# Patient Record
Sex: FEMALE | Race: WHITE
[De-identification: names, ages, dates, MRNs, and addresses within clinical notes are randomized per-mention and may not be internally consistent; named-entity substitution may affect disease eponyms.]

---

## 2018-06-24 ENCOUNTER — HOSPITAL ENCOUNTER (EMERGENCY)
Dept: HOSPITAL 17 - PHED | Age: 53
Discharge: HOME | End: 2018-06-24
Payer: COMMERCIAL

## 2018-06-24 VITALS
DIASTOLIC BLOOD PRESSURE: 61 MMHG | SYSTOLIC BLOOD PRESSURE: 126 MMHG | HEART RATE: 69 BPM | RESPIRATION RATE: 16 BRPM | OXYGEN SATURATION: 96 %

## 2018-06-24 VITALS
OXYGEN SATURATION: 95 % | HEART RATE: 78 BPM | SYSTOLIC BLOOD PRESSURE: 170 MMHG | DIASTOLIC BLOOD PRESSURE: 71 MMHG | RESPIRATION RATE: 16 BRPM | TEMPERATURE: 97.7 F

## 2018-06-24 VITALS — OXYGEN SATURATION: 96 %

## 2018-06-24 VITALS — BODY MASS INDEX: 32.21 KG/M2 | WEIGHT: 184.09 LBS | HEIGHT: 63.5 IN

## 2018-06-24 DIAGNOSIS — J45.909: ICD-10-CM

## 2018-06-24 DIAGNOSIS — E11.65: Primary | ICD-10-CM

## 2018-06-24 DIAGNOSIS — Z79.84: ICD-10-CM

## 2018-06-24 DIAGNOSIS — R51: ICD-10-CM

## 2018-06-24 DIAGNOSIS — I10: ICD-10-CM

## 2018-06-24 DIAGNOSIS — Z91.11: ICD-10-CM

## 2018-06-24 DIAGNOSIS — Z79.82: ICD-10-CM

## 2018-06-24 LAB
ALBUMIN SERPL-MCNC: 4.5 GM/DL (ref 3.4–5)
ALP SERPL-CCNC: 70 U/L (ref 45–117)
ALT SERPL-CCNC: 77 U/L (ref 10–53)
AMORPHOUS SEDIMENT, URINE: (no result)
AST SERPL-CCNC: 54 U/L (ref 15–37)
BACTERIA #/AREA URNS HPF: (no result) /HPF
BASOPHILS # BLD AUTO: 0.1 TH/MM3 (ref 0–0.2)
BASOPHILS NFR BLD: 0.9 % (ref 0–2)
BILIRUB SERPL-MCNC: 0.4 MG/DL (ref 0.2–1)
BUN SERPL-MCNC: 19 MG/DL (ref 7–18)
CALCIUM SERPL-MCNC: 9.3 MG/DL (ref 8.5–10.1)
CHLORIDE SERPL-SCNC: 98 MEQ/L (ref 98–107)
COLOR UR: YELLOW
CREAT SERPL-MCNC: 1.1 MG/DL (ref 0.5–1)
EOSINOPHIL # BLD: 0.2 TH/MM3 (ref 0–0.4)
EOSINOPHIL NFR BLD: 2.1 % (ref 0–4)
ERYTHROCYTE [DISTWIDTH] IN BLOOD BY AUTOMATED COUNT: 11.9 % (ref 11.6–17.2)
GFR SERPLBLD BASED ON 1.73 SQ M-ARVRAT: 52 ML/MIN (ref 89–?)
GLUCOSE SERPL-MCNC: 344 MG/DL (ref 74–106)
GLUCOSE UR STRIP-MCNC: (no result) MG/DL
HCO3 BLD-SCNC: 25.8 MEQ/L (ref 21–32)
HCT VFR BLD CALC: 38.9 % (ref 35–46)
HGB BLD-MCNC: 13.5 GM/DL (ref 11.6–15.3)
HGB UR QL STRIP: (no result)
KETONES UR STRIP-MCNC: (no result) MG/DL
LEUKOCYTE ESTERASE UR QL STRIP: (no result) /HPF (ref 0–5)
LYMPHOCYTES # BLD AUTO: 1.7 TH/MM3 (ref 1–4.8)
LYMPHOCYTES NFR BLD AUTO: 19.2 % (ref 9–44)
MAGNESIUM SERPL-MCNC: 1.8 MG/DL (ref 1.5–2.5)
MCH RBC QN AUTO: 30.8 PG (ref 27–34)
MCHC RBC AUTO-ENTMCNC: 34.8 % (ref 32–36)
MCV RBC AUTO: 88.7 FL (ref 80–100)
MONOCYTE #: 0.4 TH/MM3 (ref 0–0.9)
MONOCYTES NFR BLD: 4.8 % (ref 0–8)
NEUTROPHILS # BLD AUTO: 6.3 TH/MM3 (ref 1.8–7.7)
NEUTROPHILS NFR BLD AUTO: 73 % (ref 16–70)
NITRITE UR QL STRIP: (no result)
PLATELET # BLD: 157 TH/MM3 (ref 150–450)
PMV BLD AUTO: 11.1 FL (ref 7–11)
PROT SERPL-MCNC: 7.5 GM/DL (ref 6.4–8.2)
RBC # BLD AUTO: 4.39 MIL/MM3 (ref 4–5.3)
SODIUM SERPL-SCNC: 134 MEQ/L (ref 136–145)
SP GR UR STRIP: 1.01 (ref 1–1.03)
SQUAMOUS #/AREA URNS HPF: > 8 /HPF (ref 0–5)
URINE LEUKOCYTE ESTERASE: (no result)
WBC # BLD AUTO: 8.8 TH/MM3 (ref 4–11)

## 2018-06-24 PROCEDURE — 70450 CT HEAD/BRAIN W/O DYE: CPT

## 2018-06-24 PROCEDURE — 80053 COMPREHEN METABOLIC PANEL: CPT

## 2018-06-24 PROCEDURE — 96374 THER/PROPH/DIAG INJ IV PUSH: CPT

## 2018-06-24 PROCEDURE — 83735 ASSAY OF MAGNESIUM: CPT

## 2018-06-24 PROCEDURE — 87086 URINE CULTURE/COLONY COUNT: CPT

## 2018-06-24 PROCEDURE — 81001 URINALYSIS AUTO W/SCOPE: CPT

## 2018-06-24 PROCEDURE — 96361 HYDRATE IV INFUSION ADD-ON: CPT

## 2018-06-24 PROCEDURE — 99284 EMERGENCY DEPT VISIT MOD MDM: CPT

## 2018-06-24 PROCEDURE — 85025 COMPLETE CBC W/AUTO DIFF WBC: CPT

## 2018-06-24 PROCEDURE — 82010 KETONE BODYS QUAN: CPT

## 2018-06-24 NOTE — RADRPT
EXAM DATE:  6/24/2018 5:05 PM EDT

AGE/SEX:        52 years / Female



INDICATIONS:  Dizziness. Elevated blood sugar.



CLINICAL DATA:  This is the patient's initial encounter. Patient reports that signs and symptoms have
 been present for 1 day and indicates a pain score of 0/10. 

                                                                          

MEDICAL/SURGICAL HISTORY:   Diabetes.  Hypertension. Thyroidectomy.



RADIATION DOSE:  53.68 CTDI (mGy)







COMPARISON:      No prior exams available for comparison. 







TECHNIQUE:  CT of the head without contrast.  Using automated exposure control and adjustment of the 
mA and/or kV according to patient size, radiation dose was kept as low as reasonably achievable to ob
tain optimal diagnostic quality images.  DICOM format image data is available electronically for revi
ew and comparison. 



FINDINGS: 

Cerebrum:  The ventricles are normal for age.  No evidence of midline shift, mass lesion, hemorrhage 
or acute infarction.  No extraaxial fluid collections are seen.

Posterior Fossa:  The cerebellum and brainstem are intact.  The 4th ventricle is midline.  The cerebe
llopontine angle is unremarkable.

Extracranial:  The visualized portion of the orbits is intact.

Skull:  The calvaria is intact.  No evidence of skull fracture.



CONCLUSION:

1.  No acute intracranial abnormalities.



Electronically signed by: Elton Farfan MD  6/24/2018 5:15 PM EDT

## 2018-06-24 NOTE — PD
HPI


Chief Complaint:  Diabetic


Time Seen by Provider:  16:04


Travel History


International Travel<30 days:  No


Contact w/Intl Traveler<30days:  No


Traveled to known affect area:  No





History of Present Illness


HPI


Patient presents to the emergency department complaining of elevated blood 

sugar.  States that she is noncompliant with her diet and her Accu-Chek was 

397.  Took an extra glipizide around 1 this afternoon ate a quarter of a 

sandwich and repeat Accu-Chek was 396.  Is complaining of a headache.  She 

states it is normally not difficult to control her blood sugars.  He denies 

fever, chills, nausea, vomiting, chest pain, abdominal pain, dysuria.  She 

reports nonbloody diarrhea 2 days, 2 episodes daily.  Also reporting right 

side tingling and chronic dyspnea secondary to asthma. No dysuria.





PFSH


Past Medical History


Hx Anticoagulant Therapy:  Yes (asa 81mg)


Cardiovascular Problems:  Yes (htn on meds)


Diabetes:  Yes (type 2)


Diminished Hearing:  No


Genitourinary:  Yes (LEAKY BLADDER)


Hypertension:  Yes


Respiratory:  Yes (asthma)


Immunizations Current:  No


Thyroid Disease:  Yes (PARTIAL THYROID)


Pregnant?:  Not Pregnant


Menopausal:  Yes


Tubal Ligation:  Yes





Past Surgical History


Endocrine Surgery:  Yes (PARTIAL THYROID)


Tonsillectomy:  Yes


Other Surgery:  Yes (PLASTIC SURG SCALP)





Social History


Alcohol Use:  Yes (OCC)


Tobacco Use:  No (QUIT 2008)


Substance Use:  No





Allergies-Medications


(Allergen,Severity, Reaction):  


Coded Allergies:  


     codeine (Unverified  Allergy, Severe, ITCH AND HEADACHES, 6/24/18)


Reported Meds & Prescriptions





Reported Meds & Active Scripts


Active


Reported


Ventolin Hfa 18 GM Inh (Albuterol Sulfate) 90 Mcg/Act Aer 2 Puff INH Q4-6H PRN


Advair Hfa 12 GM Inh (Fluticasone-Salmeterol 12 GM Inh) 45-21 Mcg/Act Aer 2 

Puff INH BID


Januvia (Sitagliptin Phosphate) 100 Mg Tab 100 Mg PO DAILY


Amlodipine (Amlodipine Besylate) 5 Mg Tab Unknown Dose PO DAILY


Levothyroxine (Levothyroxine Sodium) 50 Mcg Tab Unknown Dose PO DAILY


Losartan (Losartan Potassium) 50 Mg Tab Unknown Dose PO DAILY


Aspirin Low Dose (Aspirin) 81 Mg Chew 81 Mg CHEW DAILY


Hydrochlorothiazide 12.5 Mg Cap Unknown Dose PO DAILY


Atorvastatin (Atorvastatin Calcium) 10 Mg Tab Unknown Dose PO HS


Oxybutynin ER 24 HR (Oxybutynin Chloride) 5 Mg Tab 5 Mg PO DAILY


Citalopram (Citalopram Hydrobromide) 20 Mg Tab 20 Mg PO DAILY


Glipizide 10 Mg Tab 10 Mg PO BIDAC


     Take 30 minutes before a meal








Review of Systems


Except as stated in HPI:  all other systems reviewed are Neg





Physical Exam


Narrative


GENERAL: No acute distress.


SKIN: Focused skin assessment warm/dry.


HEAD: Atraumatic. Normocephalic. 


EYES: Pupils equal and round. No scleral icterus. No injection or drainage. 


ENT: No nasal bleeding or discharge.  Mucous membranes pink and moist.


NECK: Trachea midline. No JVD. 


CARDIOVASCULAR: Regular rate and rhythm.  No murmur appreciated.


RESPIRATORY: No accessory muscle use. Clear to auscultation. Breath sounds 

equal bilaterally. 


GASTROINTESTINAL: Abdomen soft, non-tender, nondistended. Hepatic and splenic 

margins not palpable. 


MUSCULOSKELETAL: No obvious deformities. No clubbing.  No cyanosis.  No edema. 


NEUROLOGICAL: Awake and alert. No obvious cranial nerve deficits.  Motor 

grossly within normal limits. Normal speech.


PSYCHIATRIC: Appropriate mood and affect; insight and judgment normal.





Data


Data


Last Documented VS





Vital Signs








  Date Time  Temp Pulse Resp B/P (MAP) Pulse Ox O2 Delivery O2 Flow Rate FiO2


 


6/24/18 18:20  69 16 126/61 (82) 96 Room Air  


 


6/24/18 15:58 97.7       








Orders





 Orders


Complete Blood Count With Diff (6/24/18 16:13)


Comprehensive Metabolic Panel (6/24/18 16:13)


Magnesium (Mg) (6/24/18 16:13)


Beta Hydroxybutyrate (Acetone) (6/24/18 16:13)


Urinalysis - C+S If Indicated (6/24/18 16:13)


Ecg Monitoring (6/24/18 16:13)


Iv Access Insert/Monitor (6/24/18 16:13)


Oximetry (6/24/18 16:13)


Sodium Chlor 0.9% 1000 Ml Inj (Ns 1000 M (6/24/18 16:15)


Ct Brain W/O Iv Contrast(Rout) (6/24/18 16:13)


Urine Culture (6/24/18 16:30)


Insulin Human Regular Inj (Novolin R Inj (6/24/18 18:30)


Insulin Human Regular Inj (Novolin R Inj (6/24/18 18:45)





Labs





Laboratory Tests








Test


  6/24/18


16:30 6/24/18


16:45


 


Urine Collection Type CLEAN CATCH  


 


Urine Color YELLOW  


 


Urine Turbidity CLEAR  


 


Urine pH 6.0  


 


Urine Specific Gravity 1.010  


 


Urine Protein NEG mg/dL  


 


Urine Glucose (UA)


  1000 OR


GREATER mg/dL 


 


 


Urine Ketones NEG mg/dL  


 


Urine Occult Blood NEG  


 


Urine Nitrite NEG  


 


Urine Bilirubin NEG  


 


Urine Urobilinogen 0.2 MG/DL  


 


Urine Leukocyte Esterase NEG  


 


Urine WBC 3-5 /hpf  


 


Urine Squamous Epithelial


Cells > 8 /hpf 


  


 


 


Urine Amorphous Sediment FEW  


 


Urine Bacteria MOD /hpf  


 


Microscopic Urinalysis Comment


  CULTURE


INDICATED 


 


 


Urine Collection Time 1630  


 


White Blood Count  8.8 TH/MM3 


 


Red Blood Count  4.39 MIL/MM3 


 


Hemoglobin  13.5 GM/DL 


 


Hematocrit  38.9 % 


 


Mean Corpuscular Volume  88.7 FL 


 


Mean Corpuscular Hemoglobin  30.8 PG 


 


Mean Corpuscular Hemoglobin


Concent 


  34.8 % 


 


 


Red Cell Distribution Width  11.9 % 


 


Platelet Count  157 TH/MM3 


 


Mean Platelet Volume  11.1 FL 


 


Neutrophils (%) (Auto)  73.0 % 


 


Lymphocytes (%) (Auto)  19.2 % 


 


Monocytes (%) (Auto)  4.8 % 


 


Eosinophils (%) (Auto)  2.1 % 


 


Basophils (%) (Auto)  0.9 % 


 


Neutrophils # (Auto)  6.3 TH/MM3 


 


Lymphocytes # (Auto)  1.7 TH/MM3 


 


Monocytes # (Auto)  0.4 TH/MM3 


 


Eosinophils # (Auto)  0.2 TH/MM3 


 


Basophils # (Auto)  0.1 TH/MM3 


 


CBC Comment  DIFF FINAL 


 


Differential Comment   


 


Blood Urea Nitrogen  19 MG/DL 


 


Creatinine  1.10 MG/DL 


 


Random Glucose  344 MG/DL 


 


Total Protein  7.5 GM/DL 


 


Albumin  4.5 GM/DL 


 


Calcium Level  9.3 MG/DL 


 


Magnesium Level  1.8 MG/DL 


 


Alkaline Phosphatase  70 U/L 


 


Aspartate Amino Transf


(AST/SGOT) 


  54 U/L 


 


 


Alanine Aminotransferase


(ALT/SGPT) 


  77 U/L 


 


 


Total Bilirubin  0.4 MG/DL 


 


Sodium Level  134 MEQ/L 


 


Potassium Level  3.7 MEQ/L 


 


Chloride Level  98 MEQ/L 


 


Carbon Dioxide Level  25.8 MEQ/L 


 


Anion Gap  10 MEQ/L 


 


Estimat Glomerular Filtration


Rate 


  52 ML/MIN 


 


 


B-Hydroxybutyrate  0.20 MMOL/L 











MDM


Medical Decision Making


Medical Screen Exam Complete:  Yes


Emergency Medical Condition:  Yes


Interpretation(s)


Labs: UA positive for glucose and moderate bacteria, culture pending; CBC 

within normal limits; chem-BUN/cr/glc/AST/ALT increased; B hydroxy-wnl





Last Impressions








Head CT 6/24/18 1613 Signed





Impressions: 





 CONCLUSION:





 1.  No acute intracranial abnormalities.





  





 








Differential Diagnosis


Hyperglycemia, DKA, electrolyte abnormality, CVA


Narrative Course


Patient presents to the emergency department complaint of hyperglycemia.  

Patient placed on cardiac monitor, IV access obtained, 1 L normal saline ordered

, and head CT/labs ordered.





Accucheck after 1L IV NS->270. Patient given 5 units IV regular insulin. States 

"I'm going to go home and eat."





Diagnosis





 Primary Impression:  


 Hyperglycemia


Patient Instructions:  Diabetic Hyperglycemia (ED), General Instructions





***Additional Instructions:  


1. Meds as directed. Since the night dose of diabetes med already taken, do not 

take another dose. 2. Followup with your primary care doctor in 24-48 hours for 

possible change in diabetes medication. Will need repeat chemistry to check 

liver function tests.


Disposition:  01 DISCHARGE HOME


Condition:  Stable











Kim Khan MD Jun 24, 2018 16:20

## 2019-09-04 ENCOUNTER — HOSPITAL ENCOUNTER (EMERGENCY)
Age: 54
Discharge: HOME | End: 2019-09-04
Payer: COMMERCIAL

## 2019-09-04 VITALS
WEIGHT: 176 LBS | DIASTOLIC BLOOD PRESSURE: 78 MMHG | SYSTOLIC BLOOD PRESSURE: 170 MMHG | TEMPERATURE: 98.1 F | HEART RATE: 69 BPM | HEIGHT: 63 IN | BODY MASS INDEX: 31.18 KG/M2 | RESPIRATION RATE: 16 BRPM | OXYGEN SATURATION: 96 %

## 2019-09-04 DIAGNOSIS — Z76.0 ENCOUNTER FOR MEDICATION REFILL: Primary | ICD-10-CM

## 2019-09-04 PROCEDURE — 99281 EMR DPT VST MAYX REQ PHY/QHP: CPT

## 2019-09-04 RX ORDER — LOSARTAN POTASSIUM 50 MG/1
50 TABLET ORAL DAILY
Qty: 30 TABLET | Refills: 0 | Status: SHIPPED | OUTPATIENT
Start: 2019-09-04 | End: 2019-09-20 | Stop reason: SDUPTHER

## 2019-09-04 RX ORDER — BUPROPION HYDROCHLORIDE 100 MG/1
100 TABLET ORAL 2 TIMES DAILY
Qty: 60 TABLET | Refills: 0 | Status: SHIPPED | OUTPATIENT
Start: 2019-09-04 | End: 2019-09-20 | Stop reason: ALTCHOICE

## 2019-09-04 RX ORDER — LEVOTHYROXINE SODIUM 0.07 MG/1
75 TABLET ORAL DAILY
Qty: 30 TABLET | Refills: 0 | Status: SHIPPED | OUTPATIENT
Start: 2019-09-04 | End: 2019-09-20 | Stop reason: SDUPTHER

## 2019-09-04 RX ORDER — AMLODIPINE BESYLATE 5 MG/1
5 TABLET ORAL DAILY
Qty: 30 TABLET | Refills: 0 | Status: SHIPPED | OUTPATIENT
Start: 2019-09-04 | End: 2019-09-20 | Stop reason: SDUPTHER

## 2019-09-04 RX ORDER — ATORVASTATIN CALCIUM 10 MG/1
10 TABLET, FILM COATED ORAL DAILY
Qty: 30 TABLET | Refills: 0 | Status: SHIPPED | OUTPATIENT
Start: 2019-09-04 | End: 2019-09-20 | Stop reason: SDUPTHER

## 2019-09-04 RX ORDER — OXYBUTYNIN CHLORIDE 5 MG/1
5 TABLET, EXTENDED RELEASE ORAL EVERY EVENING
Qty: 30 TABLET | Refills: 0 | Status: SHIPPED | OUTPATIENT
Start: 2019-09-04 | End: 2019-09-20 | Stop reason: SDUPTHER

## 2019-09-04 RX ORDER — FENOFIBRATE 160 MG/1
160 TABLET ORAL DAILY
Qty: 30 TABLET | Refills: 0 | Status: SHIPPED | OUTPATIENT
Start: 2019-09-04 | End: 2019-09-20 | Stop reason: SDUPTHER

## 2019-09-04 RX ORDER — HYDROCHLOROTHIAZIDE 12.5 MG/1
12.5 CAPSULE, GELATIN COATED ORAL DAILY
Qty: 30 CAPSULE | Refills: 0 | Status: SHIPPED | OUTPATIENT
Start: 2019-09-04 | End: 2019-09-20 | Stop reason: SDUPTHER

## 2019-09-04 RX ORDER — GLIPIZIDE 10 MG/1
10 TABLET ORAL DAILY
Qty: 30 TABLET | Refills: 0 | Status: SHIPPED | OUTPATIENT
Start: 2019-09-04 | End: 2019-09-20 | Stop reason: SDUPTHER

## 2019-09-04 NOTE — ED PROVIDER NOTES
laboratory and radiology results have been personally reviewed by myself)  Labs:  No results found for this visit on 09/04/19. Imaging: All Radiology results interpreted by Radiologist unless otherwise noted. No orders to display       ED Course / Medical Decision Making   Medications - No data to display     Consults:   None    Counseling/MDM:   The emergency provider has spoken with the patient and spouse/SO and discussed todays emergency visit, in addition to providing specific details for the plan of care and counseling regarding the diagnosis and prognosis. She was counseled on the role of the emergency department regarding prescribing medications for chronic conditions including Narcotic and other controlled substances. Based on the presenting complaint and nature of illness, the requested medications will be provided today in prescription form and she is instructed to contact their provider for supplemental medications as soon as possible. Questions are answered at this time and they are agreeable with the plan. Assessment      1.  Encounter for medication refill      Plan   Discharge to home  Patient condition is stable    New Medications     Discharge Medication List as of 9/4/2019 11:58 AM      START taking these medications    Details   glipiZIDE (GLUCOTROL) 10 MG tablet Take 1 tablet by mouth daily, Disp-30 tablet, R-0Print      atorvastatin (LIPITOR) 10 MG tablet Take 1 tablet by mouth daily, Disp-30 tablet, R-0Print      amLODIPine (NORVASC) 5 MG tablet Take 1 tablet by mouth daily, Disp-30 tablet, R-0Print      fenofibrate 160 MG tablet Take 1 tablet by mouth daily, Disp-30 tablet, R-0Print      losartan (COZAAR) 50 MG tablet Take 1 tablet by mouth daily, Disp-30 tablet, R-0Print      oxybutynin (DITROPAN XL) 5 MG extended release tablet Take 1 tablet by mouth every evening, Disp-30 tablet, R-0Print      hydrochlorothiazide (MICROZIDE) 12.5 MG capsule Take 1 capsule by mouth daily, Disp-30 capsule, R-0Print      levothyroxine (SYNTHROID) 75 MCG tablet Take 1 tablet by mouth Daily, Disp-30 tablet, R-0Print      Ertugliflozin L-PyroglutamicAc (STEGLATRO) 15 MG TABS Take 1 tablet by mouth daily, Disp-30 tablet, R-0Print      buPROPion (WELLBUTRIN) 100 MG tablet Take 1 tablet by mouth 2 times daily, Disp-60 tablet, R-0Print           Electronically signed by CHANA Bradley CNP   DD: 9/4/19  **This report was transcribed using voice recognition software. Every effort was made to ensure accuracy; however, inadvertent computerized transcription errors may be present.   END OF ED PROVIDER NOTE      CHANA Bradley CNP  09/05/19 1656

## 2019-09-04 NOTE — ED NOTES
Discharge instructions given. Patient verbalizes understanding. No other noted or stated problems at this time. Patient will follow up with primary care.      Radha Velasquez RN  09/04/19 [id number]

## 2019-09-20 ENCOUNTER — OFFICE VISIT (OUTPATIENT)
Dept: PRIMARY CARE CLINIC | Age: 54
End: 2019-09-20
Payer: COMMERCIAL

## 2019-09-20 VITALS
RESPIRATION RATE: 18 BRPM | HEART RATE: 76 BPM | TEMPERATURE: 97.9 F | BODY MASS INDEX: 30.62 KG/M2 | WEIGHT: 172.8 LBS | OXYGEN SATURATION: 98 % | HEIGHT: 63 IN | SYSTOLIC BLOOD PRESSURE: 118 MMHG | DIASTOLIC BLOOD PRESSURE: 72 MMHG

## 2019-09-20 DIAGNOSIS — E03.9 ACQUIRED HYPOTHYROIDISM: ICD-10-CM

## 2019-09-20 DIAGNOSIS — E78.2 MIXED HYPERLIPIDEMIA: ICD-10-CM

## 2019-09-20 DIAGNOSIS — F33.0 MILD EPISODE OF RECURRENT MAJOR DEPRESSIVE DISORDER (HCC): ICD-10-CM

## 2019-09-20 DIAGNOSIS — N39.3 STRESS INCONTINENCE: ICD-10-CM

## 2019-09-20 DIAGNOSIS — M62.838 MUSCLE SPASM: ICD-10-CM

## 2019-09-20 DIAGNOSIS — E11.8 TYPE 2 DIABETES MELLITUS WITH COMPLICATION, WITHOUT LONG-TERM CURRENT USE OF INSULIN (HCC): ICD-10-CM

## 2019-09-20 DIAGNOSIS — G89.29 CHRONIC RIGHT-SIDED LOW BACK PAIN WITH RIGHT-SIDED SCIATICA: ICD-10-CM

## 2019-09-20 DIAGNOSIS — I10 ESSENTIAL HYPERTENSION: Primary | ICD-10-CM

## 2019-09-20 DIAGNOSIS — M54.41 CHRONIC RIGHT-SIDED LOW BACK PAIN WITH RIGHT-SIDED SCIATICA: ICD-10-CM

## 2019-09-20 PROCEDURE — 1036F TOBACCO NON-USER: CPT | Performed by: FAMILY MEDICINE

## 2019-09-20 PROCEDURE — 99204 OFFICE O/P NEW MOD 45 MIN: CPT | Performed by: FAMILY MEDICINE

## 2019-09-20 PROCEDURE — 2022F DILAT RTA XM EVC RTNOPTHY: CPT | Performed by: FAMILY MEDICINE

## 2019-09-20 PROCEDURE — G8417 CALC BMI ABV UP PARAM F/U: HCPCS | Performed by: FAMILY MEDICINE

## 2019-09-20 PROCEDURE — 3046F HEMOGLOBIN A1C LEVEL >9.0%: CPT | Performed by: FAMILY MEDICINE

## 2019-09-20 PROCEDURE — G8427 DOCREV CUR MEDS BY ELIG CLIN: HCPCS | Performed by: FAMILY MEDICINE

## 2019-09-20 PROCEDURE — 3017F COLORECTAL CA SCREEN DOC REV: CPT | Performed by: FAMILY MEDICINE

## 2019-09-20 RX ORDER — FENOFIBRATE 160 MG/1
160 TABLET ORAL DAILY
Qty: 30 TABLET | Refills: 5 | Status: SHIPPED
Start: 2019-09-20 | End: 2020-03-18 | Stop reason: SDUPTHER

## 2019-09-20 RX ORDER — CYCLOBENZAPRINE HCL 10 MG
10 TABLET ORAL 2 TIMES DAILY PRN
COMMUNITY
End: 2019-09-20 | Stop reason: SDUPTHER

## 2019-09-20 RX ORDER — ATORVASTATIN CALCIUM 10 MG/1
10 TABLET, FILM COATED ORAL DAILY
Qty: 30 TABLET | Refills: 5 | Status: SHIPPED
Start: 2019-09-20 | End: 2020-03-18 | Stop reason: SDUPTHER

## 2019-09-20 RX ORDER — GLIPIZIDE 10 MG/1
10 TABLET ORAL DAILY
Qty: 30 TABLET | Refills: 5 | Status: SHIPPED
Start: 2019-09-20 | End: 2020-03-18

## 2019-09-20 RX ORDER — AMLODIPINE BESYLATE 5 MG/1
5 TABLET ORAL DAILY
Qty: 30 TABLET | Refills: 5 | Status: SHIPPED
Start: 2019-09-20 | End: 2020-03-18 | Stop reason: SDUPTHER

## 2019-09-20 RX ORDER — ASPIRIN 81 MG/1
81 TABLET ORAL DAILY
COMMUNITY
End: 2021-08-18 | Stop reason: SDUPTHER

## 2019-09-20 RX ORDER — OXYBUTYNIN CHLORIDE 5 MG/1
5 TABLET, EXTENDED RELEASE ORAL EVERY EVENING
Qty: 30 TABLET | Refills: 5 | Status: SHIPPED | OUTPATIENT
Start: 2019-09-20 | End: 2019-10-16 | Stop reason: SDUPTHER

## 2019-09-20 RX ORDER — DIPHENHYDRAMINE HCL 25 MG
25 CAPSULE ORAL NIGHTLY
COMMUNITY
End: 2021-08-18 | Stop reason: ALTCHOICE

## 2019-09-20 RX ORDER — BIOTIN 1 MG
TABLET ORAL DAILY
COMMUNITY
End: 2020-03-18

## 2019-09-20 RX ORDER — LEVOTHYROXINE SODIUM 0.07 MG/1
75 TABLET ORAL DAILY
Qty: 30 TABLET | Refills: 5 | Status: SHIPPED | OUTPATIENT
Start: 2019-09-20 | End: 2019-09-25 | Stop reason: DRUGHIGH

## 2019-09-20 RX ORDER — ALBUTEROL SULFATE 90 UG/1
2 AEROSOL, METERED RESPIRATORY (INHALATION) EVERY 4 HOURS PRN
COMMUNITY
End: 2020-03-27 | Stop reason: SDUPTHER

## 2019-09-20 RX ORDER — SERTRALINE HYDROCHLORIDE 25 MG/1
25 TABLET, FILM COATED ORAL DAILY
Qty: 30 TABLET | Refills: 0 | Status: SHIPPED | OUTPATIENT
Start: 2019-09-20 | End: 2019-10-16 | Stop reason: SDUPTHER

## 2019-09-20 RX ORDER — HYDROCHLOROTHIAZIDE 12.5 MG/1
12.5 CAPSULE, GELATIN COATED ORAL DAILY
Qty: 30 CAPSULE | Refills: 5 | Status: SHIPPED | OUTPATIENT
Start: 2019-09-20 | End: 2019-12-18 | Stop reason: ALTCHOICE

## 2019-09-20 RX ORDER — CYCLOBENZAPRINE HCL 10 MG
10 TABLET ORAL DAILY PRN
Qty: 30 TABLET | Refills: 5 | Status: SHIPPED | OUTPATIENT
Start: 2019-09-20 | End: 2019-10-20

## 2019-09-20 RX ORDER — BUPROPION HYDROCHLORIDE 100 MG/1
100 TABLET ORAL 2 TIMES DAILY
Qty: 60 TABLET | Refills: 0 | Status: CANCELLED | OUTPATIENT
Start: 2019-09-20

## 2019-09-20 RX ORDER — LOSARTAN POTASSIUM 50 MG/1
50 TABLET ORAL DAILY
Qty: 30 TABLET | Refills: 5 | Status: SHIPPED
Start: 2019-09-20 | End: 2020-03-18 | Stop reason: SDUPTHER

## 2019-09-20 RX ORDER — MELOXICAM 7.5 MG/1
7.5 TABLET ORAL DAILY PRN
Qty: 90 TABLET | Refills: 1 | Status: SHIPPED | OUTPATIENT
Start: 2019-09-20 | End: 2019-10-16

## 2019-09-20 ASSESSMENT — ENCOUNTER SYMPTOMS
CONSTIPATION: 0
ABDOMINAL PAIN: 0
NAUSEA: 0
SHORTNESS OF BREATH: 0
COUGH: 0
DIARRHEA: 0
VOMITING: 0
BACK PAIN: 1
WHEEZING: 0

## 2019-09-20 ASSESSMENT — PATIENT HEALTH QUESTIONNAIRE - PHQ9
9. THOUGHTS THAT YOU WOULD BE BETTER OFF DEAD, OR OF HURTING YOURSELF: 0
2. FEELING DOWN, DEPRESSED OR HOPELESS: 2
SUM OF ALL RESPONSES TO PHQ QUESTIONS 1-9: 8
6. FEELING BAD ABOUT YOURSELF - OR THAT YOU ARE A FAILURE OR HAVE LET YOURSELF OR YOUR FAMILY DOWN: 3
1. LITTLE INTEREST OR PLEASURE IN DOING THINGS: 0
3. TROUBLE FALLING OR STAYING ASLEEP: 0
SUM OF ALL RESPONSES TO PHQ9 QUESTIONS 1 & 2: 2
8. MOVING OR SPEAKING SO SLOWLY THAT OTHER PEOPLE COULD HAVE NOTICED. OR THE OPPOSITE, BEING SO FIGETY OR RESTLESS THAT YOU HAVE BEEN MOVING AROUND A LOT MORE THAN USUAL: 0
SUM OF ALL RESPONSES TO PHQ QUESTIONS 1-9: 2
SUM OF ALL RESPONSES TO PHQ9 QUESTIONS 1 & 2: 2
2. FEELING DOWN, DEPRESSED OR HOPELESS: 2
SUM OF ALL RESPONSES TO PHQ QUESTIONS 1-9: 8
SUM OF ALL RESPONSES TO PHQ QUESTIONS 1-9: 2
7. TROUBLE CONCENTRATING ON THINGS, SUCH AS READING THE NEWSPAPER OR WATCHING TELEVISION: 0
5. POOR APPETITE OR OVEREATING: 3
4. FEELING TIRED OR HAVING LITTLE ENERGY: 0
1. LITTLE INTEREST OR PLEASURE IN DOING THINGS: 0

## 2019-09-20 NOTE — PROGRESS NOTES
19  Harsha Polanco : 1965 Sex: female  Age: 48 y.o. Chief Complaint   Patient presents with    Establish Care     check-up    Health Maintenance     due for mammogram,pap,tdap,flu,hep c,shingrix       HPI:  48 y.o. female presents today to establish care with a new provider. Her and her  recently moved to the area from [place]. Patient's chart, medical, surgical and medication history all reviewed with the patient. No information in the system. Patient seen at [facility] for medication refills on 19. Diabetes Mellitus  48 y.o. female presents for follow up of type 2 diabetes. Current diabetic medications include: oral agent (monotherapy): glipizide (Glucotrol) and insulin injections: Lantus : 30U QAM.  Previous medications tried include: oral agents (dual therapy): metformin (generic), Steglatro, but failed due to nausea, vomiting and abdominal pain (Metformin) and cost/coverage (Steglatro). Patient checks blood sugars 0 times per day. Most recent HgA1c was >11% per the patient- no recent labs to review. Patient spoke with Alcon Cummings and was told that Lantus will have to be changed to Lauren  or [place]. Steglatro will not be covered. Hypertension   The patient presents today for follow up of HTN. The problem is well controlled. Risk factors for coronary artery disease include Age > 27, HTN, diabetes and elevated cholesterol. Current treatments include amlodipine (Norvase), hydrochlorothiazide (HCTZ) and losartran (Cozaar). The patient is compliant most of the time. Lifestyle changes the patient has made include none. Today the patient is complaining of none. Hypothyroidism  Patient presents for routine follow up of Hypothyroidism. Current symptoms: none. Patient denies change in energy level, diarrhea, heat / cold intolerance, palpitations and weight changes. Symptoms have been well-controlled. No difficulty swallowing or masses felt.   Last TSH was in [place] Diagnosis Date    Depression     Hyperlipidemia     Hypertension     Hypothyroidism     Type 2 diabetes mellitus without complication (HCC)     Urinary incontinence      Past Surgical History:   Procedure Laterality Date    THYROIDECTOMY, PARTIAL      TONSILLECTOMY      TUBAL LIGATION       Family History   Problem Relation Age of Onset    Pancreatic Cancer Mother         Metastatic to liver    Other Father         Doesn't know biological father    Depression Sister     Lung Cancer Maternal Grandmother     Heart Attack Maternal Grandfather 37     Social History     Socioeconomic History    Marital status:      Spouse name: Not on file    Number of children: Not on file    Years of education: Not on file    Highest education level: Not on file   Occupational History    Not on file   Social Needs    Financial resource strain: Not on file    Food insecurity:     Worry: Not on file     Inability: Not on file    Transportation needs:     Medical: Not on file     Non-medical: Not on file   Tobacco Use    Smoking status: Former Smoker     Packs/day: 2.00     Years: 20.00     Pack years: 40.00     Types: Cigarettes     Last attempt to quit:      Years since quittin.7    Smokeless tobacco: Never Used   Substance and Sexual Activity    Alcohol use: Not on file    Drug use: Not on file    Sexual activity: Not on file   Lifestyle    Physical activity:     Days per week: Not on file     Minutes per session: Not on file    Stress: Not on file   Relationships    Social connections:     Talks on phone: Not on file     Gets together: Not on file     Attends [employer] service: Not on file     Active member of club or organization: Not on file     Attends meetings of clubs or organizations: Not on file     Relationship status: Not on file    Intimate partner violence:     Fear of current or ex partner: Not on file     Emotionally abused: Not on file     Physically abused: Not on file all orders for this visit:    Essential hypertension  -     losartan (COZAAR) 50 MG tablet; Take 1 tablet by mouth daily  -     hydrochlorothiazide (MICROZIDE) 12.5 MG capsule; Take 1 capsule by mouth daily  -     amLODIPine (NORVASC) 5 MG tablet; Take 1 tablet by mouth daily  -     CBC Auto Differential; Future  -     Comprehensive Metabolic Panel; Future  Improved since ER visit now that she is back on medication. Will likely combine Losartan and HCTZ for ease in the future. Type 2 diabetes mellitus with complication, without long-term current use of insulin ([facility])  -     glipiZIDE (GLUCOTROL) 10 MG tablet; Take 1 tablet by mouth daily  -     Insulin Degludec (TRESIBA FLEXTOUCH) 200 UNIT/ML SOPN; Inject 30 Units into the skin nightly  -     Hemoglobin A1C; Future  -     Microalbumin / Creatinine Urine Ratio; Future  Need labs to guide further therapy. Will change Lantus to [place] given insurance change. Will see her back in 1 month to discuss labs and DM2 treatment course    Mixed hyperlipidemia  -     fenofibrate 160 MG tablet; Take 1 tablet by mouth daily  -     atorvastatin (LIPITOR) 10 MG tablet; Take 1 tablet by mouth daily  -     Lipid Panel; Future  Discussed increasing dose of Atorvastatin and DC Fenofibrate. Will check labs first.    Acquired hypothyroidism  -     levothyroxine (SYNTHROID) 75 MCG tablet; Take 1 tablet by mouth Daily  -     TSH without Reflex; Future  Need labs    Stress incontinence  -     oxybutynin (DITROPAN XL) 5 MG extended release tablet; Take 1 tablet by mouth every evening    Muscle spasm  -     cyclobenzaprine (FLEXERIL) 10 MG tablet; Take 1 tablet by mouth daily as needed for Muscle spasms  Patient takes PRN    Mild episode of recurrent major depressive disorder (HCC)  -     sertraline (ZOLOFT) 25 MG tablet; Take 1 tablet by mouth daily  -     Vitamin D 25 Hydroxy; Future  Will change Wellbutrin to Zoloft as it is no longer benfitting her.   Will wean off Wellbutrin while

## 2019-09-24 ENCOUNTER — HOSPITAL ENCOUNTER (OUTPATIENT)
Age: 54
Discharge: HOME | End: 2019-09-26
Payer: COMMERCIAL

## 2019-09-24 DIAGNOSIS — E78.2 MIXED HYPERLIPIDEMIA: ICD-10-CM

## 2019-09-24 DIAGNOSIS — E03.9 ACQUIRED HYPOTHYROIDISM: ICD-10-CM

## 2019-09-24 DIAGNOSIS — I10 ESSENTIAL HYPERTENSION: ICD-10-CM

## 2019-09-24 DIAGNOSIS — F33.0 MILD EPISODE OF RECURRENT MAJOR DEPRESSIVE DISORDER (HCC): ICD-10-CM

## 2019-09-24 DIAGNOSIS — E11.8 TYPE 2 DIABETES MELLITUS WITH COMPLICATION, WITHOUT LONG-TERM CURRENT USE OF INSULIN (HCC): ICD-10-CM

## 2019-09-24 LAB
ALBUMIN SERPL-MCNC: 4.8 G/DL (ref 3.5–5.2)
ALP BLD-CCNC: 88 U/L (ref 35–104)
ALT SERPL-CCNC: 40 U/L (ref 0–32)
ANION GAP SERPL CALCULATED.3IONS-SCNC: 19 MMOL/L (ref 7–16)
AST SERPL-CCNC: 40 U/L (ref 0–31)
BASOPHILS ABSOLUTE: 0.06 E9/L (ref 0–0.2)
BASOPHILS RELATIVE PERCENT: 0.8 % (ref 0–2)
BILIRUB SERPL-MCNC: 0.5 MG/DL (ref 0–1.2)
BUN BLDV-MCNC: 22 MG/DL (ref 6–20)
CALCIUM SERPL-MCNC: 9.3 MG/DL (ref 8.6–10.2)
CHLORIDE BLD-SCNC: 96 MMOL/L (ref 98–107)
CHOLESTEROL, TOTAL: 179 MG/DL (ref 0–199)
CO2: 20 MMOL/L (ref 22–29)
CREAT SERPL-MCNC: 0.8 MG/DL (ref 0.5–1)
CREATININE URINE: 113 MG/DL (ref 29–226)
EOSINOPHILS ABSOLUTE: 0.26 E9/L (ref 0.05–0.5)
EOSINOPHILS RELATIVE PERCENT: 3.3 % (ref 0–6)
GFR AFRICAN AMERICAN: >60
GFR NON-AFRICAN AMERICAN: >60 ML/MIN/1.73
GLUCOSE BLD-MCNC: 337 MG/DL (ref 74–99)
HBA1C MFR BLD: 10.5 % (ref 4–5.6)
HCT VFR BLD CALC: 42.7 % (ref 34–48)
HDLC SERPL-MCNC: 29 MG/DL
HEMOGLOBIN: 14 G/DL (ref 11.5–15.5)
IMMATURE GRANULOCYTES #: 0.06 E9/L
IMMATURE GRANULOCYTES %: 0.8 % (ref 0–5)
LDL CHOLESTEROL CALCULATED: ABNORMAL MG/DL (ref 0–99)
LYMPHOCYTES ABSOLUTE: 1.89 E9/L (ref 1.5–4)
LYMPHOCYTES RELATIVE PERCENT: 24.3 % (ref 20–42)
MCH RBC QN AUTO: 29.1 PG (ref 26–35)
MCHC RBC AUTO-ENTMCNC: 32.8 % (ref 32–34.5)
MCV RBC AUTO: 88.8 FL (ref 80–99.9)
MICROALBUMIN UR-MCNC: 21.8 MG/L
MICROALBUMIN/CREAT UR-RTO: 19.3 (ref 0–30)
MONOCYTES ABSOLUTE: 0.49 E9/L (ref 0.1–0.95)
MONOCYTES RELATIVE PERCENT: 6.3 % (ref 2–12)
NEUTROPHILS ABSOLUTE: 5.03 E9/L (ref 1.8–7.3)
NEUTROPHILS RELATIVE PERCENT: 64.5 % (ref 43–80)
PDW BLD-RTO: 12.2 FL (ref 11.5–15)
PLATELET # BLD: 150 E9/L (ref 130–450)
PMV BLD AUTO: 12.8 FL (ref 7–12)
POTASSIUM SERPL-SCNC: 4 MMOL/L (ref 3.5–5)
RBC # BLD: 4.81 E12/L (ref 3.5–5.5)
SODIUM BLD-SCNC: 135 MMOL/L (ref 132–146)
TOTAL PROTEIN: 7.4 G/DL (ref 6.4–8.3)
TRIGL SERPL-MCNC: 426 MG/DL (ref 0–149)
TSH SERPL DL<=0.05 MIU/L-ACNC: 5.87 UIU/ML (ref 0.27–4.2)
VITAMIN D 25-HYDROXY: 17 NG/ML (ref 30–100)
VLDLC SERPL CALC-MCNC: ABNORMAL MG/DL
WBC # BLD: 7.8 E9/L (ref 4.5–11.5)

## 2019-09-24 PROCEDURE — 82570 ASSAY OF URINE CREATININE: CPT

## 2019-09-24 PROCEDURE — 85025 COMPLETE CBC W/AUTO DIFF WBC: CPT

## 2019-09-24 PROCEDURE — 80053 COMPREHEN METABOLIC PANEL: CPT

## 2019-09-24 PROCEDURE — 83036 HEMOGLOBIN GLYCOSYLATED A1C: CPT

## 2019-09-24 PROCEDURE — 84443 ASSAY THYROID STIM HORMONE: CPT

## 2019-09-24 PROCEDURE — 82306 VITAMIN D 25 HYDROXY: CPT

## 2019-09-24 PROCEDURE — 82044 UR ALBUMIN SEMIQUANTITATIVE: CPT

## 2019-09-24 PROCEDURE — 80061 LIPID PANEL: CPT

## 2019-09-25 ENCOUNTER — TELEPHONE (OUTPATIENT)
Dept: PRIMARY CARE CLINIC | Age: 54
End: 2019-09-25

## 2019-09-25 DIAGNOSIS — E03.9 ACQUIRED HYPOTHYROIDISM: Primary | ICD-10-CM

## 2019-09-25 DIAGNOSIS — E55.9 VITAMIN D DEFICIENCY: ICD-10-CM

## 2019-09-25 RX ORDER — LEVOTHYROXINE SODIUM 88 UG/1
88 TABLET ORAL DAILY
Qty: 90 TABLET | Refills: 1 | Status: SHIPPED
Start: 2019-09-25 | End: 2020-03-18 | Stop reason: SDUPTHER

## 2019-09-25 RX ORDER — CHOLECALCIFEROL (VITAMIN D3) 50 MCG
2000 TABLET ORAL DAILY
Qty: 90 TABLET | Refills: 1 | Status: SHIPPED
Start: 2019-09-25 | End: 2020-03-18

## 2019-10-16 ENCOUNTER — OFFICE VISIT (OUTPATIENT)
Dept: PRIMARY CARE CLINIC | Age: 54
End: 2019-10-16
Payer: COMMERCIAL

## 2019-10-16 VITALS
BODY MASS INDEX: 30.65 KG/M2 | OXYGEN SATURATION: 96 % | SYSTOLIC BLOOD PRESSURE: 110 MMHG | DIASTOLIC BLOOD PRESSURE: 66 MMHG | TEMPERATURE: 97.9 F | WEIGHT: 173 LBS | HEART RATE: 67 BPM | RESPIRATION RATE: 16 BRPM

## 2019-10-16 DIAGNOSIS — E11.65 TYPE 2 DIABETES MELLITUS WITH HYPERGLYCEMIA, WITH LONG-TERM CURRENT USE OF INSULIN (HCC): Primary | ICD-10-CM

## 2019-10-16 DIAGNOSIS — N39.3 STRESS INCONTINENCE: ICD-10-CM

## 2019-10-16 DIAGNOSIS — E03.9 ACQUIRED HYPOTHYROIDISM: ICD-10-CM

## 2019-10-16 DIAGNOSIS — M67.442 MUCOUS CYST OF DIGIT OF LEFT HAND: ICD-10-CM

## 2019-10-16 DIAGNOSIS — Z79.4 TYPE 2 DIABETES MELLITUS WITH HYPERGLYCEMIA, WITH LONG-TERM CURRENT USE OF INSULIN (HCC): Primary | ICD-10-CM

## 2019-10-16 DIAGNOSIS — E55.9 VITAMIN D DEFICIENCY: ICD-10-CM

## 2019-10-16 DIAGNOSIS — Z23 NEED FOR INFLUENZA VACCINATION: ICD-10-CM

## 2019-10-16 DIAGNOSIS — M54.41 CHRONIC RIGHT-SIDED LOW BACK PAIN WITH RIGHT-SIDED SCIATICA: ICD-10-CM

## 2019-10-16 DIAGNOSIS — F33.0 MILD EPISODE OF RECURRENT MAJOR DEPRESSIVE DISORDER (HCC): ICD-10-CM

## 2019-10-16 DIAGNOSIS — E78.2 MIXED HYPERLIPIDEMIA: ICD-10-CM

## 2019-10-16 DIAGNOSIS — G89.29 CHRONIC RIGHT-SIDED LOW BACK PAIN WITH RIGHT-SIDED SCIATICA: ICD-10-CM

## 2019-10-16 PROCEDURE — G8417 CALC BMI ABV UP PARAM F/U: HCPCS | Performed by: FAMILY MEDICINE

## 2019-10-16 PROCEDURE — 3046F HEMOGLOBIN A1C LEVEL >9.0%: CPT | Performed by: FAMILY MEDICINE

## 2019-10-16 PROCEDURE — 2022F DILAT RTA XM EVC RTNOPTHY: CPT | Performed by: FAMILY MEDICINE

## 2019-10-16 PROCEDURE — G8482 FLU IMMUNIZE ORDER/ADMIN: HCPCS | Performed by: FAMILY MEDICINE

## 2019-10-16 PROCEDURE — 99214 OFFICE O/P EST MOD 30 MIN: CPT | Performed by: FAMILY MEDICINE

## 2019-10-16 PROCEDURE — 90686 IIV4 VACC NO PRSV 0.5 ML IM: CPT | Performed by: FAMILY MEDICINE

## 2019-10-16 PROCEDURE — 3017F COLORECTAL CA SCREEN DOC REV: CPT | Performed by: FAMILY MEDICINE

## 2019-10-16 PROCEDURE — 1036F TOBACCO NON-USER: CPT | Performed by: FAMILY MEDICINE

## 2019-10-16 PROCEDURE — 90471 IMMUNIZATION ADMIN: CPT | Performed by: FAMILY MEDICINE

## 2019-10-16 PROCEDURE — G8427 DOCREV CUR MEDS BY ELIG CLIN: HCPCS | Performed by: FAMILY MEDICINE

## 2019-10-16 RX ORDER — MELOXICAM 15 MG/1
15 TABLET ORAL DAILY
Qty: 30 TABLET | Refills: 5 | Status: SHIPPED | OUTPATIENT
Start: 2019-10-16 | End: 2019-12-18

## 2019-10-16 RX ORDER — OXYBUTYNIN CHLORIDE 5 MG/1
5 TABLET, EXTENDED RELEASE ORAL EVERY EVENING
Qty: 30 TABLET | Refills: 5 | Status: SHIPPED
Start: 2019-10-16 | End: 2020-03-18 | Stop reason: SDUPTHER

## 2019-10-16 RX ORDER — MELATONIN 5 MG
5 TABLET,CHEWABLE ORAL NIGHTLY
COMMUNITY
End: 2021-08-18 | Stop reason: ALTCHOICE

## 2019-10-16 RX ORDER — PEN NEEDLE, DIABETIC 30 GX3/16"
1 NEEDLE, DISPOSABLE MISCELLANEOUS DAILY
Qty: 100 EACH | Refills: 3 | Status: SHIPPED | OUTPATIENT
Start: 2019-10-16 | End: 2020-01-23 | Stop reason: SDUPTHER

## 2019-10-16 RX ORDER — PEN NEEDLE, DIABETIC 30 GX3/16"
1 NEEDLE, DISPOSABLE MISCELLANEOUS DAILY
COMMUNITY
End: 2019-10-16 | Stop reason: SDUPTHER

## 2019-10-16 ASSESSMENT — ENCOUNTER SYMPTOMS
WHEEZING: 0
NAUSEA: 0
COUGH: 0
ABDOMINAL PAIN: 0
DIARRHEA: 0
CONSTIPATION: 0
VOMITING: 0
BACK PAIN: 1
SHORTNESS OF BREATH: 0

## 2019-10-21 ENCOUNTER — TELEPHONE (OUTPATIENT)
Dept: PRIMARY CARE CLINIC | Age: 54
End: 2019-10-21

## 2019-12-04 DIAGNOSIS — Z79.4 TYPE 2 DIABETES MELLITUS WITH HYPERGLYCEMIA, WITH LONG-TERM CURRENT USE OF INSULIN (HCC): ICD-10-CM

## 2019-12-04 DIAGNOSIS — E11.65 TYPE 2 DIABETES MELLITUS WITH HYPERGLYCEMIA, WITH LONG-TERM CURRENT USE OF INSULIN (HCC): ICD-10-CM

## 2019-12-06 ENCOUNTER — TELEPHONE (OUTPATIENT)
Dept: PRIMARY CARE CLINIC | Age: 54
End: 2019-12-06

## 2019-12-13 ENCOUNTER — TELEPHONE (OUTPATIENT)
Dept: ORTHOPEDIC SURGERY | Age: 54
End: 2019-12-13

## 2019-12-13 ENCOUNTER — HOSPITAL ENCOUNTER (OUTPATIENT)
Age: 54
Discharge: HOME | End: 2019-12-15
Payer: COMMERCIAL

## 2019-12-13 DIAGNOSIS — R52 PAIN: Primary | ICD-10-CM

## 2019-12-13 DIAGNOSIS — Z79.4 TYPE 2 DIABETES MELLITUS WITH HYPERGLYCEMIA, WITH LONG-TERM CURRENT USE OF INSULIN (HCC): ICD-10-CM

## 2019-12-13 DIAGNOSIS — E03.9 ACQUIRED HYPOTHYROIDISM: ICD-10-CM

## 2019-12-13 DIAGNOSIS — E78.2 MIXED HYPERLIPIDEMIA: ICD-10-CM

## 2019-12-13 DIAGNOSIS — E55.9 VITAMIN D DEFICIENCY: ICD-10-CM

## 2019-12-13 DIAGNOSIS — E11.65 TYPE 2 DIABETES MELLITUS WITH HYPERGLYCEMIA, WITH LONG-TERM CURRENT USE OF INSULIN (HCC): ICD-10-CM

## 2019-12-13 LAB
CHOLESTEROL, TOTAL: 160 MG/DL (ref 0–199)
HBA1C MFR BLD: 9.7 % (ref 4–5.6)
HDLC SERPL-MCNC: 45 MG/DL
LDL CHOLESTEROL CALCULATED: 92 MG/DL (ref 0–99)
TRIGL SERPL-MCNC: 117 MG/DL (ref 0–149)
TSH SERPL DL<=0.05 MIU/L-ACNC: 3.43 UIU/ML (ref 0.27–4.2)
VITAMIN D 25-HYDROXY: 31 NG/ML (ref 30–100)
VLDLC SERPL CALC-MCNC: 23 MG/DL

## 2019-12-13 PROCEDURE — 36415 COLL VENOUS BLD VENIPUNCTURE: CPT

## 2019-12-13 PROCEDURE — 84443 ASSAY THYROID STIM HORMONE: CPT

## 2019-12-13 PROCEDURE — 83036 HEMOGLOBIN GLYCOSYLATED A1C: CPT

## 2019-12-13 PROCEDURE — 82306 VITAMIN D 25 HYDROXY: CPT

## 2019-12-13 PROCEDURE — 80061 LIPID PANEL: CPT

## 2019-12-16 ENCOUNTER — OFFICE VISIT (OUTPATIENT)
Dept: ORTHOPEDIC SURGERY | Age: 54
End: 2019-12-16
Payer: COMMERCIAL

## 2019-12-16 VITALS
WEIGHT: 165.8 LBS | RESPIRATION RATE: 16 BRPM | SYSTOLIC BLOOD PRESSURE: 128 MMHG | DIASTOLIC BLOOD PRESSURE: 78 MMHG | HEIGHT: 64 IN | BODY MASS INDEX: 28.31 KG/M2 | HEART RATE: 55 BPM

## 2019-12-16 DIAGNOSIS — M18.0 ARTHRITIS OF CARPOMETACARPAL (CMC) JOINT OF BOTH THUMBS: ICD-10-CM

## 2019-12-16 DIAGNOSIS — M79.641 BILATERAL HAND PAIN: ICD-10-CM

## 2019-12-16 DIAGNOSIS — R52 PAIN: Primary | ICD-10-CM

## 2019-12-16 DIAGNOSIS — M79.642 BILATERAL HAND PAIN: ICD-10-CM

## 2019-12-16 PROCEDURE — 99203 OFFICE O/P NEW LOW 30 MIN: CPT | Performed by: ORTHOPAEDIC SURGERY

## 2019-12-16 PROCEDURE — 1036F TOBACCO NON-USER: CPT | Performed by: ORTHOPAEDIC SURGERY

## 2019-12-16 PROCEDURE — 3017F COLORECTAL CA SCREEN DOC REV: CPT | Performed by: ORTHOPAEDIC SURGERY

## 2019-12-16 PROCEDURE — 20605 DRAIN/INJ JOINT/BURSA W/O US: CPT | Performed by: ORTHOPAEDIC SURGERY

## 2019-12-16 PROCEDURE — G8427 DOCREV CUR MEDS BY ELIG CLIN: HCPCS | Performed by: ORTHOPAEDIC SURGERY

## 2019-12-16 PROCEDURE — G8417 CALC BMI ABV UP PARAM F/U: HCPCS | Performed by: ORTHOPAEDIC SURGERY

## 2019-12-16 PROCEDURE — G8482 FLU IMMUNIZE ORDER/ADMIN: HCPCS | Performed by: ORTHOPAEDIC SURGERY

## 2019-12-16 RX ORDER — BETAMETHASONE SODIUM PHOSPHATE AND BETAMETHASONE ACETATE 3; 3 MG/ML; MG/ML
12 INJECTION, SUSPENSION INTRA-ARTICULAR; INTRALESIONAL; INTRAMUSCULAR; SOFT TISSUE ONCE
Status: COMPLETED | OUTPATIENT
Start: 2019-12-16 | End: 2019-12-16

## 2019-12-16 RX ADMIN — BETAMETHASONE SODIUM PHOSPHATE AND BETAMETHASONE ACETATE 12 MG: 3; 3 INJECTION, SUSPENSION INTRA-ARTICULAR; INTRALESIONAL; INTRAMUSCULAR; SOFT TISSUE at 10:29

## 2019-12-18 ENCOUNTER — OFFICE VISIT (OUTPATIENT)
Dept: PRIMARY CARE CLINIC | Age: 54
End: 2019-12-18
Payer: COMMERCIAL

## 2019-12-18 VITALS
WEIGHT: 164 LBS | OXYGEN SATURATION: 97 % | BODY MASS INDEX: 28.6 KG/M2 | SYSTOLIC BLOOD PRESSURE: 128 MMHG | RESPIRATION RATE: 12 BRPM | TEMPERATURE: 98.1 F | DIASTOLIC BLOOD PRESSURE: 60 MMHG | HEART RATE: 55 BPM

## 2019-12-18 DIAGNOSIS — E11.65 TYPE 2 DIABETES MELLITUS WITH HYPERGLYCEMIA, WITH LONG-TERM CURRENT USE OF INSULIN (HCC): Primary | ICD-10-CM

## 2019-12-18 DIAGNOSIS — Z23 NEED FOR PNEUMOCOCCAL VACCINATION: ICD-10-CM

## 2019-12-18 DIAGNOSIS — G89.29 CHRONIC RIGHT-SIDED LOW BACK PAIN WITH RIGHT-SIDED SCIATICA: ICD-10-CM

## 2019-12-18 DIAGNOSIS — M54.41 CHRONIC RIGHT-SIDED LOW BACK PAIN WITH RIGHT-SIDED SCIATICA: ICD-10-CM

## 2019-12-18 DIAGNOSIS — E78.2 MIXED HYPERLIPIDEMIA: ICD-10-CM

## 2019-12-18 DIAGNOSIS — I10 ESSENTIAL HYPERTENSION: ICD-10-CM

## 2019-12-18 DIAGNOSIS — B00.2 ORAL HERPES SIMPLEX INFECTION: ICD-10-CM

## 2019-12-18 DIAGNOSIS — F33.0 MILD EPISODE OF RECURRENT MAJOR DEPRESSIVE DISORDER (HCC): ICD-10-CM

## 2019-12-18 DIAGNOSIS — Z79.4 TYPE 2 DIABETES MELLITUS WITH HYPERGLYCEMIA, WITH LONG-TERM CURRENT USE OF INSULIN (HCC): Primary | ICD-10-CM

## 2019-12-18 DIAGNOSIS — E03.9 ACQUIRED HYPOTHYROIDISM: ICD-10-CM

## 2019-12-18 PROCEDURE — G8417 CALC BMI ABV UP PARAM F/U: HCPCS | Performed by: FAMILY MEDICINE

## 2019-12-18 PROCEDURE — 90732 PPSV23 VACC 2 YRS+ SUBQ/IM: CPT | Performed by: FAMILY MEDICINE

## 2019-12-18 PROCEDURE — 2022F DILAT RTA XM EVC RTNOPTHY: CPT | Performed by: FAMILY MEDICINE

## 2019-12-18 PROCEDURE — G8427 DOCREV CUR MEDS BY ELIG CLIN: HCPCS | Performed by: FAMILY MEDICINE

## 2019-12-18 PROCEDURE — 90471 IMMUNIZATION ADMIN: CPT | Performed by: FAMILY MEDICINE

## 2019-12-18 PROCEDURE — G8482 FLU IMMUNIZE ORDER/ADMIN: HCPCS | Performed by: FAMILY MEDICINE

## 2019-12-18 PROCEDURE — 3017F COLORECTAL CA SCREEN DOC REV: CPT | Performed by: FAMILY MEDICINE

## 2019-12-18 PROCEDURE — 3046F HEMOGLOBIN A1C LEVEL >9.0%: CPT | Performed by: FAMILY MEDICINE

## 2019-12-18 PROCEDURE — 1036F TOBACCO NON-USER: CPT | Performed by: FAMILY MEDICINE

## 2019-12-18 PROCEDURE — 99214 OFFICE O/P EST MOD 30 MIN: CPT | Performed by: FAMILY MEDICINE

## 2019-12-18 RX ORDER — INSULIN DEGLUDEC 200 U/ML
INJECTION, SOLUTION SUBCUTANEOUS
Refills: 5 | COMMUNITY
Start: 2019-12-04 | End: 2020-06-22 | Stop reason: SDUPTHER

## 2019-12-18 RX ORDER — KETOROLAC TROMETHAMINE 10 MG/1
10 TABLET, FILM COATED ORAL EVERY 6 HOURS PRN
Qty: 120 TABLET | Refills: 5 | Status: SHIPPED
Start: 2019-12-18 | End: 2020-03-18

## 2019-12-18 RX ORDER — VALACYCLOVIR HYDROCHLORIDE 1 G/1
2000 TABLET, FILM COATED ORAL 2 TIMES DAILY
Qty: 8 TABLET | Refills: 0 | Status: SHIPPED | OUTPATIENT
Start: 2019-12-18 | End: 2019-12-20

## 2019-12-18 RX ORDER — SERTRALINE HYDROCHLORIDE 100 MG/1
100 TABLET, FILM COATED ORAL DAILY
Qty: 90 TABLET | Refills: 1 | Status: SHIPPED
Start: 2019-12-18 | End: 2020-03-18

## 2019-12-18 ASSESSMENT — ENCOUNTER SYMPTOMS
DIARRHEA: 0
SHORTNESS OF BREATH: 0
COUGH: 0
NAUSEA: 0
WHEEZING: 0
ABDOMINAL PAIN: 0
BACK PAIN: 1
VOMITING: 0
CONSTIPATION: 0

## 2019-12-30 ENCOUNTER — TELEPHONE (OUTPATIENT)
Dept: PRIMARY CARE CLINIC | Age: 54
End: 2019-12-30

## 2019-12-30 NOTE — TELEPHONE ENCOUNTER
She had similar side effects with Meloxicam.  Patient may just be sensitive to the NSAID class of medication. She will mostly likely have the same effect with all medications in that class. We really need to get her into PM to get a steroid injection in her SI joint if she is willing. As far as her anti-depressant, we can either increase the Zoloft to 200 mg or add a mood stabilizer like Abilify.

## 2019-12-30 NOTE — TELEPHONE ENCOUNTER
Pt called and said her ketorolac is making her sick to her stomach and she is wondering if there is a med you could send to replace this. She was also calling regarding her anti-depressant medication and said she has been on it for two weeks now and all she does is cry and gets angry. She said she had to leave work because it was so bad so she is wondering if she needs to increase the dosage again. Please advise.

## 2019-12-31 RX ORDER — ARIPIPRAZOLE 5 MG/1
5 TABLET ORAL DAILY
Qty: 30 TABLET | Refills: 3 | Status: SHIPPED
Start: 2019-12-31 | End: 2020-03-18 | Stop reason: SDUPTHER

## 2019-12-31 NOTE — TELEPHONE ENCOUNTER
Pt notified. She would like the steroid injection, can you place the order for this? She also would like to try the mood stabilizer.

## 2020-01-10 ENCOUNTER — OFFICE VISIT (OUTPATIENT)
Dept: PHYSICAL MEDICINE AND REHAB | Age: 55
End: 2020-01-10
Payer: COMMERCIAL

## 2020-01-10 VITALS
WEIGHT: 162 LBS | SYSTOLIC BLOOD PRESSURE: 124 MMHG | HEART RATE: 69 BPM | HEIGHT: 64 IN | DIASTOLIC BLOOD PRESSURE: 71 MMHG | OXYGEN SATURATION: 97 % | BODY MASS INDEX: 27.66 KG/M2

## 2020-01-10 PROCEDURE — G8482 FLU IMMUNIZE ORDER/ADMIN: HCPCS | Performed by: PHYSICAL MEDICINE & REHABILITATION

## 2020-01-10 PROCEDURE — 1036F TOBACCO NON-USER: CPT | Performed by: PHYSICAL MEDICINE & REHABILITATION

## 2020-01-10 PROCEDURE — G8417 CALC BMI ABV UP PARAM F/U: HCPCS | Performed by: PHYSICAL MEDICINE & REHABILITATION

## 2020-01-10 PROCEDURE — 3017F COLORECTAL CA SCREEN DOC REV: CPT | Performed by: PHYSICAL MEDICINE & REHABILITATION

## 2020-01-10 PROCEDURE — 99204 OFFICE O/P NEW MOD 45 MIN: CPT | Performed by: PHYSICAL MEDICINE & REHABILITATION

## 2020-01-10 PROCEDURE — G8427 DOCREV CUR MEDS BY ELIG CLIN: HCPCS | Performed by: PHYSICAL MEDICINE & REHABILITATION

## 2020-01-10 RX ORDER — ACETAMINOPHEN 500 MG
1000 TABLET ORAL 3 TIMES DAILY
Qty: 180 TABLET | Refills: 2 | Status: SHIPPED
Start: 2020-01-10 | End: 2020-03-18

## 2020-01-10 NOTE — PATIENT INSTRUCTIONS
- Take prescription strength acetaminophen, or Tylenol, 1000 mg (two 500 mg tablets) three times daily as needed for pain. Remember to never take more than 3000 mg within a 24 hour period.   Never take with alcohol.     - Check out [facility].

## 2020-01-13 ENCOUNTER — TELEPHONE (OUTPATIENT)
Dept: PHYSICAL MEDICINE AND REHAB | Age: 55
End: 2020-01-13

## 2020-01-13 NOTE — TELEPHONE ENCOUNTER
[place]and[place] left a message stating the medication she was using that was helpful is Diclofenac 1% gel and Lidocaine 4% (ANE cream)    Electronically signed by Forrest Mejia MA on 1/13/2020 at 7:58 AM

## 2020-01-21 ENCOUNTER — OFFICE VISIT (OUTPATIENT)
Dept: FAMILY MEDICINE CLINIC | Age: 55
End: 2020-01-21
Payer: COMMERCIAL

## 2020-01-21 ENCOUNTER — TELEPHONE (OUTPATIENT)
Dept: PRIMARY CARE CLINIC | Age: 55
End: 2020-01-21

## 2020-01-21 VITALS
TEMPERATURE: 98.3 F | WEIGHT: 167.2 LBS | BODY MASS INDEX: 29.15 KG/M2 | SYSTOLIC BLOOD PRESSURE: 122 MMHG | HEART RATE: 69 BPM | DIASTOLIC BLOOD PRESSURE: 72 MMHG

## 2020-01-21 PROBLEM — K21.9 GERD (GASTROESOPHAGEAL REFLUX DISEASE): Status: ACTIVE | Noted: 2020-01-21

## 2020-01-21 PROCEDURE — 99213 OFFICE O/P EST LOW 20 MIN: CPT | Performed by: FAMILY MEDICINE

## 2020-01-21 PROCEDURE — 3017F COLORECTAL CA SCREEN DOC REV: CPT | Performed by: FAMILY MEDICINE

## 2020-01-21 PROCEDURE — G8417 CALC BMI ABV UP PARAM F/U: HCPCS | Performed by: FAMILY MEDICINE

## 2020-01-21 PROCEDURE — G8427 DOCREV CUR MEDS BY ELIG CLIN: HCPCS | Performed by: FAMILY MEDICINE

## 2020-01-21 PROCEDURE — G8482 FLU IMMUNIZE ORDER/ADMIN: HCPCS | Performed by: FAMILY MEDICINE

## 2020-01-21 PROCEDURE — 1036F TOBACCO NON-USER: CPT | Performed by: FAMILY MEDICINE

## 2020-01-21 RX ORDER — OMEPRAZOLE 40 MG/1
40 CAPSULE, DELAYED RELEASE ORAL DAILY
Qty: 30 CAPSULE | Refills: 1 | Status: SHIPPED
Start: 2020-01-21 | End: 2020-03-18 | Stop reason: SDUPTHER

## 2020-01-21 RX ORDER — METOCLOPRAMIDE 5 MG/1
5 TABLET ORAL 3 TIMES DAILY
Qty: 120 TABLET | Refills: 3 | Status: SHIPPED
Start: 2020-01-21 | End: 2020-06-22 | Stop reason: SDUPTHER

## 2020-01-21 ASSESSMENT — ENCOUNTER SYMPTOMS
NAUSEA: 1
DIARRHEA: 0
CONSTIPATION: 1
RESPIRATORY NEGATIVE: 1
RECTAL PAIN: 0
ANAL BLEEDING: 0
ABDOMINAL PAIN: 0
ABDOMINAL DISTENTION: 0
BLOOD IN STOOL: 0
VOMITING: 1

## 2020-01-21 NOTE — PROGRESS NOTES
(ADVAIR DISKUS) 100-50 MCG/DOSE diskus inhaler, Inhale 1 puff into the lungs every 12 hours Indications: prn, Disp: , Rfl:     losartan (COZAAR) 50 MG tablet, Take 1 tablet by mouth daily, Disp: 30 tablet, Rfl: 5    fenofibrate 160 MG tablet, Take 1 tablet by mouth daily, Disp: 30 tablet, Rfl: 5    atorvastatin (LIPITOR) 10 MG tablet, Take 1 tablet by mouth daily, Disp: 30 tablet, Rfl: 5    amLODIPine (NORVASC) 5 MG tablet, Take 1 tablet by mouth daily, Disp: 30 tablet, Rfl: 5    acetaminophen (TYLENOL) 500 MG tablet, Take 2 tablets by mouth 3 times daily (Patient not taking: Reported on 1/21/2020), Disp: 180 tablet, Rfl: 2    ketorolac (TORADOL) 10 MG tablet, Take 1 tablet by mouth every 6 hours as needed for Pain (Patient not taking: Reported on 1/10/2020), Disp: 120 tablet, Rfl: 5    Insulin Pen Needle (PEN NEEDLES) 31G X 5 MM MISC, Inject 1 Dose into the skin daily, Disp: 100 each, Rfl: 3    Cholecalciferol (VITAMIN D) 2000 units TABS tablet, Take 1 tablet by mouth daily, Disp: 90 tablet, Rfl: 1    Biotin 1000 MCG TABS, Take by mouth daily, Disp: , Rfl:     albuterol sulfate HFA (PROAIR HFA) 108 (90 Base) MCG/ACT inhaler, Inhale 2 puffs into the lungs every 4 hours as needed for Wheezing, Disp: , Rfl:     glipiZIDE (GLUCOTROL) 10 MG tablet, Take 1 tablet by mouth daily (Patient not taking: Reported on 1/21/2020), Disp: 30 tablet, Rfl: 5  No Known Allergies    Past Medical History:   Diagnosis Date    Depression     Hyperlipidemia     Hypertension     Hypothyroidism     Type 2 diabetes mellitus without complication (HCC)     Urinary incontinence      Past Surgical History:   Procedure Laterality Date    THYROIDECTOMY, PARTIAL      TONSILLECTOMY      TUBAL LIGATION       Family History   Problem Relation Age of Onset    Pancreatic Cancer Mother         Metastatic to liver    Other Father         Doesn't know biological father    Depression Sister     Lung Cancer Maternal Grandmother     Heart Attack Maternal Grandfather 37     Social History     Tobacco Use    Smoking status: Former Smoker     Packs/day: 2.00     Years: 20.00     Pack years: 40.00     Types: Cigarettes     Last attempt to quit:      Years since quittin.0    Smokeless tobacco: Never Used   Substance Use Topics    Alcohol use: Not on file    Drug use: Not on file        Vitals:    20 0800   BP: 122/72   Site: Right Upper Arm   Position: Sitting   Pulse: 69   Temp: 98.3 °F (36.8 °C)   TempSrc: Temporal   Weight: 167 lb 3.2 oz (75.8 kg)       Physical Exam  Constitutional:       Appearance: Normal appearance. HENT:      Head: Normocephalic and atraumatic. Cardiovascular:      Rate and Rhythm: Normal rate and regular rhythm. Heart sounds: No murmur. Pulmonary:      Effort: Pulmonary effort is normal.      Breath sounds: Normal breath sounds. Abdominal:      General: There is no distension. Palpations: Abdomen is soft. There is no mass. Tenderness: There is tenderness. There is no right CVA tenderness, left CVA tenderness, guarding or rebound. Hernia: No hernia is present. Neurological:      Mental Status: She is alert. Assessment and Plan:  Stevie Hernández was seen today for emesis and abdominal pain. Diagnoses and all orders for this visit:    Acute gastroenteritis    Other orders  -     omeprazole (PRILOSEC) 40 MG delayed release capsule; Take 1 capsule by mouth daily  -     metoclopramide (REGLAN) 5 MG tablet; Take 1 tablet by mouth 3 times daily        Orders Placed This Encounter   Medications    omeprazole (PRILOSEC) 40 MG delayed release capsule     Sig: Take 1 capsule by mouth daily     Dispense:  30 capsule     Refill:  1    metoclopramide (REGLAN) 5 MG tablet     Sig: Take 1 tablet by mouth 3 times daily     Dispense:  120 tablet     Refill:  3        Patient advised to follow up with PCP as needed.         Seen By:  Jazmine Cadena DO

## 2020-01-22 ENCOUNTER — HOSPITAL ENCOUNTER (OUTPATIENT)
Dept: PHYSICAL THERAPY | Age: 55
Setting detail: THERAPIES SERIES
Discharge: HOME | End: 2020-01-22
Payer: COMMERCIAL

## 2020-01-22 PROCEDURE — 97161 PT EVAL LOW COMPLEX 20 MIN: CPT

## 2020-01-22 PROCEDURE — G0283 ELEC STIM OTHER THAN WOUND: HCPCS

## 2020-01-22 NOTE — PROGRESS NOTES
Physical Therapy  Initial Assessment  Date: 2020  Patient Name: Nicol Deleon  MRN: [id number]  : 1965          Restrictions       Subjective   General  Chart Reviewed: Yes  Patient assessed for rehabilitation services?: Yes  Additional Pertinent Hx: PAtient presents to PT with complaint of persistent pain affecting the righ tlower lumbar/LS region. Pain began in Apr/May 2019. No specifc GERONIMO. No past hx of back injury or pathology. Family / Caregiver Present: No  Referring Practitioner: Dr Gabi Chavez   Referral Date : 01/10/20  Diagnosis: Back Pain   Follows Commands: Within Functional Limits  PT Visit Information  Onset Date: 01/10/20  PT Insurance Information: [employer]   Subjective  Subjective: Pain located right lowwer lumbar and right SI region Pain reated Constant Pain intensity will vary   Pain Screening  Patient Currently in Pain: Yes  Vital Signs  Patient Currently in Pain: Yes    Vision/Hearing  Vision  Vision: Within Functional Limits  Hearing  Hearing: Within functional limits    Orientation  Orientation  Overall Orientation Status: Within Functional Limits    Social/Functional History  Social/Functional History  Lives With: Spouse  Type of Home: Apartment  Home Layout: One level  Homemaking Responsibilities: Yes  Active : Yes  Mode of Transportation: Car  Occupation: Part time employment    Objective     Observation/Palpation  Posture: Fair  Palpation: Pain right PSIS/Right sacral margin The right base of the sacrum is elevated vs the left     AROM RLE (degrees)  RLE AROM: WFL  AROM LLE (degrees)  LLE AROM : WFL  Spine  Lumbar: Limited all ranges with pain     Strength RLE  Comment: 4-/5   Strength LLE  Comment: 4-/5   Strength Other  Other: trunk/core 3+/5                                                    Assessment   Conditions Requiring Skilled Therapeutic Intervention  Body structures, Functions, Activity limitations: Decreased functional mobility ; Decreased posture;Decreased endurance;Decreased ROM; Decreased strength; Increased pain  Prognosis: Fair  Decision Making: Low Complexity  REQUIRES PT FOLLOW UP: Yes         Plan   Plan  Times per week: 2  Plan weeks: 5  Current Treatment Recommendations: Strengthening, ROM, Modalities, Functional Mobility Training, Endurance Training, Manual Therapy - Soft Tissue Mobilization, Home Exercise Program    G-Code       OutComes Score                                                  AM-PAC Score             Goals          Therapy Time   Individual Concurrent Group Co-treatment   Time In 0900         Time Out 0950         Minutes 50         Timed Code Treatment Minutes: 39 Minutes       Velma Rodriguez, PT

## 2020-01-23 RX ORDER — PEN NEEDLE, DIABETIC 30 GX3/16"
1 NEEDLE, DISPOSABLE MISCELLANEOUS DAILY
Qty: 100 EACH | Refills: 3 | Status: SHIPPED
Start: 2020-01-23 | End: 2020-06-11 | Stop reason: SDUPTHER

## 2020-01-27 ENCOUNTER — HOSPITAL ENCOUNTER (OUTPATIENT)
Dept: PHYSICAL THERAPY | Age: 55
Setting detail: THERAPIES SERIES
Discharge: HOME | End: 2020-01-27
Payer: COMMERCIAL

## 2020-01-27 PROCEDURE — 97110 THERAPEUTIC EXERCISES: CPT

## 2020-01-27 PROCEDURE — G0283 ELEC STIM OTHER THAN WOUND: HCPCS

## 2020-02-04 RX ORDER — FLUTICASONE PROPIONATE 50 MCG
1 SPRAY, SUSPENSION (ML) NASAL 2 TIMES DAILY
Qty: 2 BOTTLE | Refills: 2 | Status: SHIPPED
Start: 2020-02-04 | End: 2020-03-18

## 2020-02-11 ENCOUNTER — HOSPITAL ENCOUNTER (OUTPATIENT)
Dept: PHYSICAL THERAPY | Age: 55
Setting detail: THERAPIES SERIES
Discharge: HOME | End: 2020-02-11
Payer: COMMERCIAL

## 2020-02-11 PROCEDURE — G0283 ELEC STIM OTHER THAN WOUND: HCPCS

## 2020-02-13 ENCOUNTER — HOSPITAL ENCOUNTER (OUTPATIENT)
Dept: PHYSICAL THERAPY | Age: 55
Setting detail: THERAPIES SERIES
End: 2020-02-13
Payer: COMMERCIAL

## 2020-02-13 NOTE — PROGRESS NOTES
[facility]  Phone: [phone] Fax: [phone]     Physical Therapy  Cancellation/No-show Note  Patient Name:  Lavonne Mckay  :  1965   Date:  2020    For today's appointment patient:  [x]  Cancelled  []  Rescheduled appointment  []  No-show     Reason given by patient:  []  Patient ill  []  Conflicting appointment  []  No transportation    [x]  Conflict with work  []  No reason given  [x]  Other:     Comments: Patient to call regarding next PT session      Electronically signed by:  Marcia Cooley, [address] [facility]

## 2020-02-19 ENCOUNTER — HOSPITAL ENCOUNTER (OUTPATIENT)
Dept: PHYSICAL THERAPY | Age: 55
Setting detail: THERAPIES SERIES
Discharge: HOME | End: 2020-02-19
Payer: COMMERCIAL

## 2020-02-19 PROCEDURE — 97110 THERAPEUTIC EXERCISES: CPT

## 2020-02-19 PROCEDURE — G0283 ELEC STIM OTHER THAN WOUND: HCPCS

## 2020-02-19 NOTE — PROGRESS NOTES
[facility]  Phone: [phone] Fax: [phone]       Physical Therapy Daily Treatment Note  Date:  2020    Patient Name:  Mae Escobedo    :  1965  MRN: [id number]    Restrictions/Precautions:    Diagnosis:  Back/SI region Pain   Treatment Diagnosis:    Insurance/Certification information:  [facility]   Referring Physician:  Dr Lady Chapa of care signed (Y/N):    Visit# / total visits:  4/  Pain level: /10  Time In:  715   Time Out: 5663         Subjective: Per patient pain is persisting with no significant modification to date.  Patient notes work tasks and static postures both seem to worsen her pain issues     Exercises:  Exercise/Equipment Resistance/Repetitions Other comments                                                                                                                                             Other Therapeutic Activities:      Home Exercise Program:      Manual Treatments: Right LE long axis traction and Right hip ROM      Modalities:  Interferential estim right  Posterior hip 20 min with heat     Timed Code Treatment Minutes:  30    Total Treatment Minutes:  40    Treatment/Activity Tolerance:  [x] Patient tolerated treatment well [] Patient limited by fatigue  [] Patient limited by pain  [] Patient limited by other medical complications  [] Other:     Prognosis: [] Good [x] Fair  [] Poor    Patient Requires Follow-up: [x] Yes  [] No    Plan:   [x] Continue per plan of care [] Alter current plan (see comments)  [] Plan of care initiated [] Hold pending MD visit [] Discharge  Plan for Next Session:         Electronically signed by:  Ryan Goel, [address] [place] [address]

## 2020-02-21 ENCOUNTER — OFFICE VISIT (OUTPATIENT)
Dept: NEUROLOGY | Age: 55
End: 2020-02-21
Payer: COMMERCIAL

## 2020-02-21 ENCOUNTER — HOSPITAL ENCOUNTER (OUTPATIENT)
Dept: PHYSICAL THERAPY | Age: 55
Setting detail: THERAPIES SERIES
End: 2020-02-21
Payer: COMMERCIAL

## 2020-02-21 VITALS
DIASTOLIC BLOOD PRESSURE: 50 MMHG | HEIGHT: 63 IN | WEIGHT: 157 LBS | SYSTOLIC BLOOD PRESSURE: 100 MMHG | BODY MASS INDEX: 27.82 KG/M2

## 2020-02-21 PROBLEM — M79.641 BILATERAL HAND PAIN: Status: ACTIVE | Noted: 2020-02-21

## 2020-02-21 PROBLEM — M79.642 BILATERAL HAND PAIN: Status: ACTIVE | Noted: 2020-02-21

## 2020-02-21 PROCEDURE — 95911 NRV CNDJ TEST 9-10 STUDIES: CPT | Performed by: PSYCHIATRY & NEUROLOGY

## 2020-02-21 PROCEDURE — 99201 PR OFFICE OUTPATIENT NEW 10 MINUTES: CPT | Performed by: PSYCHIATRY & NEUROLOGY

## 2020-02-21 PROCEDURE — 1036F TOBACCO NON-USER: CPT | Performed by: PSYCHIATRY & NEUROLOGY

## 2020-02-21 PROCEDURE — G8482 FLU IMMUNIZE ORDER/ADMIN: HCPCS | Performed by: PSYCHIATRY & NEUROLOGY

## 2020-02-21 PROCEDURE — 3017F COLORECTAL CA SCREEN DOC REV: CPT | Performed by: PSYCHIATRY & NEUROLOGY

## 2020-02-21 PROCEDURE — 95886 MUSC TEST DONE W/N TEST COMP: CPT | Performed by: PSYCHIATRY & NEUROLOGY

## 2020-02-21 PROCEDURE — 95885 MUSC TST DONE W/NERV TST LIM: CPT | Performed by: PSYCHIATRY & NEUROLOGY

## 2020-02-21 PROCEDURE — G8427 DOCREV CUR MEDS BY ELIG CLIN: HCPCS | Performed by: PSYCHIATRY & NEUROLOGY

## 2020-02-21 PROCEDURE — G8417 CALC BMI ABV UP PARAM F/U: HCPCS | Performed by: PSYCHIATRY & NEUROLOGY

## 2020-02-21 NOTE — PROGRESS NOTES
[facility]  Phone: [phone] Fax: [phone]     Physical Therapy  Cancellation/No-show Note  Patient Name:  Teofilo Schrader  :  1965   Date:  2020    For today's appointment patient:  [x]  Cancelled  []  Rescheduled appointment  []  No-show     Reason given by patient:  []  Patient ill  [x]  Conflicting appointment  []  No transportation    []  Conflict with work  []  No reason given  [x]  Other:     Comments: Next PT session 2020    Electronically signed by:  Katelynn Ewing, [address] [facility]

## 2020-02-21 NOTE — FLOWSHEET NOTE
[facility]  Phone: [phone] Fax: [phone]     Physical Therapy  Out Patient Initial Evaluation    Date:  2020    Patient Name:  Yuval Steward    :  1965  MRN: [id number]    DIAGNOSIS:  Back Pain   EVALUATION DATE:  2020  REFERRING PHYSICIAN:  Dr Sanam Arteaga/10/2020    PROBLEMS FOUND DURING EVALUATION  · Pain: Affects the right lower lumbar/Right SI region Pain rated Constant Pain intensity variable and related to activity   · Limited ROM/Mobility lumbar and Lumbo-pelvic region   · Deficits of strength trunk/core and LE's     SHORT TERM GOALS  · Patient will report a consistent and sustained reduction in acute back pain symptoms   · Establish HEP    LONG TERM GOALS  · Patient will be able to sustain normal ADL's and work tasks while being able to consistently control back pain symptoms at 3/10 or less  · AROM trunk and hip region will be Fair to [employer]  · Strength trunk/core 3+ to 4-/5   · Patient will be able to maintain and self direct an appropriate follow up independent exercise program     PATIENT GOALS  · Control pain and maintain function     REHAB POTENTIAL:  Fair    FREQUENCY/DURATION:  2-3 times per week 4-5 weeks     PLAN OF CARE:  Modalities Manual therapy Progressive exercise Postural education HEP      Thank you for the opportunity to work with your patient. If you have questions or comments, please feel free to contact me by phone or fax.       Electronically Signed by Arianna Sinclair  PT [id number]        ___________________________________  2020    Physician     Date

## 2020-02-21 NOTE — PROGRESS NOTES
[facility]  Electrodiagnostic Laboratory  *Accredited by the [address] [place] [address] with exemplary status  [facility]  Phone: [phone]  Fax: [phone]    Referring Provider: Toby Phillips MD  Primary Care Physician: Jerrica Horne DO  Patient Name: Kristina Beach  Patient YOB: 1965  Gender: female  BMI: Body mass index is 27.81 kg/m². Blood pressure (!) 100/50, height 5' 3\" (1.6 m), weight 157 lb (71.2 kg). 2/21/2020    Description of clinical problem: Complains of numbness and tingling sensation and pain in left greater than right hands. Chief Complaint   Patient presents with    Procedure     EMG     Pain Yes   ; Numbness/tingling  Yes; Weakness  No       Brief physical exam:   Sensory deficit No; Weakness No; Atrophy  Yes; Reflex abnormality No  Limited neurologic exam performed of the bilateral upper extremity shows grossly intact 5/5 power in all muscle groups including hand  strength, finger opposition, finger abduction/adduction, wrist flexion/extension, biceps/triceps, deltoids, shoulder shrug. Motor tone is normal.  Intact fine motor function of both hands, symmetric. No fasciculations noted. Mild atrophy or flattening of the thenar muscles bilaterally. DTRs: 1+ and symmetric. Negative Tinel's test to percussion over both wrists. Sensory exam to light touch and sharp stick testing is reported is grossly intact subjectively throughout. Study Limitations: None      Full Name: Kristina Beach Gender: Female  MRN: [id number] YOB: 1965  Location[de-identified] [facility]      Visit Date: 2/21/2020 09:56  Age: 47 Years 1 Months Old  Examining Physician: Dr. Jack Cordero   Referring Physician: Dr. Ba Bermeo  Technician: Tenisha Badillo   Height: 5 feet 3 inch  Weight: 157 lbs  Notes: Bilateral hand pain      Motor NCS      Nerve / Sites Latency Amplitude Amp. 1-2 Distance Lat Diff Velocity Temp.    ms mV % cm ms m/s °C   R Median - APB      Wrist 4.95 7.2 sensory nerve conductions recording from the second digit is prolonged in distal latency with normal amplitudes. · The right median motor nerve conduction (recording from the abductor pollicis brevis muscle) is prolonged in distal latency with normal amplitudes and mildly slowed motor nerve conduction velocity as compared to the left side. · All other nerve conduction studies listed in the table above were normal in latency, amplitude and conduction velocity. Needle EMG:   · Needle EMG was performed using a concentric needle. · The following abnormalities were seen on needle EMG: Enlarged motor unit potentials in duration and amplitude with decreased recruitment (loss of motor units) of a moderately severe degree on the right and mild degree on the left with 1+ polyphasia (reinnervation motor units) as listed.  All other muscles tested, as listed in the table above demonstrated normal amplitude, duration, phases and recruitment and no active denervation signs were seen. Diagnostic Interpretation: This study was abnormal.     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 Clinical correlation is recommended. Technologist:   Physician: Halie Bennett MD    Nerve conduction studies and electromyography were performed according to our laboratory policies and procedures which can be provided upon request. All abnormal values are identified in the table.  Laboratory normal values can also be provided upon request.       Cc: MD Jelena Gallego DO

## 2020-02-26 ENCOUNTER — HOSPITAL ENCOUNTER (OUTPATIENT)
Dept: PHYSICAL THERAPY | Age: 55
Setting detail: THERAPIES SERIES
Discharge: HOME | End: 2020-02-26
Payer: COMMERCIAL

## 2020-02-26 PROCEDURE — 97110 THERAPEUTIC EXERCISES: CPT

## 2020-02-26 PROCEDURE — G0283 ELEC STIM OTHER THAN WOUND: HCPCS

## 2020-02-26 NOTE — PROGRESS NOTES
[facility]  Phone: [phone] Fax: [phone]       Physical Therapy Daily Treatment Note  Date:  2020    Patient Name:  Zane Lange    :  1965  MRN: [id number]    Restrictions/Precautions:    Diagnosis:  Back/SI region Pain   Treatment Diagnosis:    Insurance/Certification information:  [facility]   Referring Physician:  Dr Melyssa Ocampo of care signed (Y/N):    Visit# / total visits:  5/  Pain level: /10  Time In:  15   Time Out:          Subjective:     Exercises:  Exercise/Equipment Resistance/Repetitions Other comments                                                                                                                                             Other Therapeutic Activities:      Home Exercise Program:  Patient provided with written/illustrated HEP Program was reviewed with patient and all questions addressed     Manual Treatments:     Modalities:  Interferential estim right  Posterior hip 20 min with heat     Timed Code Treatment Minutes:  30    Total Treatment Minutes:  40    Treatment/Activity Tolerance:  [x] Patient tolerated treatment well [] Patient limited by fatigue  [] Patient limited by pain  [] Patient limited by other medical complications  [] Other:     Prognosis: [] Good [x] Fair  [] Poor    Patient Requires Follow-up: [x] Yes  [] No    Plan:   [x] Continue per plan of care [] Alter current plan (see comments)  [] Plan of care initiated [] Hold pending MD visit [] Discharge  Plan for Next Session:         Electronically signed by:  Mel Dolan, [address] [place] [address]

## 2020-03-02 ENCOUNTER — HOSPITAL ENCOUNTER (OUTPATIENT)
Dept: PHYSICAL THERAPY | Age: 55
Setting detail: THERAPIES SERIES
Discharge: HOME | End: 2020-03-02
Payer: COMMERCIAL

## 2020-03-02 PROCEDURE — 97140 MANUAL THERAPY 1/> REGIONS: CPT

## 2020-03-02 PROCEDURE — G0283 ELEC STIM OTHER THAN WOUND: HCPCS

## 2020-03-04 ENCOUNTER — HOSPITAL ENCOUNTER (OUTPATIENT)
Dept: PHYSICAL THERAPY | Age: 55
Setting detail: THERAPIES SERIES
End: 2020-03-04
Payer: COMMERCIAL

## 2020-03-04 NOTE — PROGRESS NOTES
[facility]  Phone: [phone] Fax: [phone]     Physical Therapy  Cancellation/No-show Note  Patient Name:  Jeevan Conley  :  1965   Date:  3/4/2020    For today's appointment patient:  [x]  Cancelled  []  Rescheduled appointment  []  No-show     Reason given by patient:  []  Patient ill  []  Conflicting appointment  []  No transportation    [x]  Conflict with work  []  No reason given  [x]  Other:     Comments: Next PT session 2020    Electronically signed by:  Elvin Pabon, [address] [facility]

## 2020-03-06 ENCOUNTER — HOSPITAL ENCOUNTER (OUTPATIENT)
Dept: PHYSICAL THERAPY | Age: 55
Setting detail: THERAPIES SERIES
End: 2020-03-06
Payer: COMMERCIAL

## 2020-03-09 ENCOUNTER — HOSPITAL ENCOUNTER (OUTPATIENT)
Dept: PHYSICAL THERAPY | Age: 55
Setting detail: THERAPIES SERIES
Discharge: HOME | End: 2020-03-09
Payer: COMMERCIAL

## 2020-03-09 PROCEDURE — G0283 ELEC STIM OTHER THAN WOUND: HCPCS

## 2020-03-09 PROCEDURE — 97110 THERAPEUTIC EXERCISES: CPT

## 2020-03-11 ENCOUNTER — HOSPITAL ENCOUNTER (OUTPATIENT)
Dept: PHYSICAL THERAPY | Age: 55
Setting detail: THERAPIES SERIES
Discharge: HOME | End: 2020-03-11
Payer: COMMERCIAL

## 2020-03-11 PROCEDURE — G0283 ELEC STIM OTHER THAN WOUND: HCPCS

## 2020-03-11 PROCEDURE — 97140 MANUAL THERAPY 1/> REGIONS: CPT

## 2020-03-11 NOTE — PROGRESS NOTES
[facility]  Phone: [phone] Fax: [phone]       Physical Therapy Daily Treatment Note  Date:  3/11/2020    Patient Name:  Marlyn Raymond    :  1965  MRN: [id number]    Restrictions/Precautions:    Diagnosis:  Back/SI region Pain   Treatment Diagnosis:    Insurance/Certification information:  [facility]   Referring Physician:  Dr Narendra Matt of care signed (Y/N):    Visit# / total visits:  8/  Pain level: /10  Time In:  815   Time Out: 7246         Subjective:     Exercises:  Exercise/Equipment Resistance/Repetitions Other comments                                                                                                                                             Other Therapeutic Activities:      Home Exercise Program:  Soft tissue mobilization with Piriformis and SI mobs     Manual Treatments:     Modalities:  Interferential estim right  Posterior hip 20 min with heat     Timed Code Treatment Minutes:  30    Total Treatment Minutes:  40    Treatment/Activity Tolerance:  [x] Patient tolerated treatment well [] Patient limited by fatigue  [] Patient limited by pain  [] Patient limited by other medical complications  [] Other:     Prognosis: [] Good [x] Fair  [] Poor    Patient Requires Follow-up: [x] Yes  [] No    Plan:   [x] Continue per plan of care [] Alter current plan (see comments)  [] Plan of care initiated [] Hold pending MD visit [] Discharge  Plan for Next Session:         Electronically signed by:  Maikol Juarez, [address] [place] [address]

## 2020-03-13 ENCOUNTER — HOSPITAL ENCOUNTER (OUTPATIENT)
Dept: PHYSICAL THERAPY | Age: 55
Setting detail: THERAPIES SERIES
End: 2020-03-13
Payer: COMMERCIAL

## 2020-03-13 NOTE — PROGRESS NOTES
[facility]  Phone: [phone] Fax: [phone]     Physical Therapy  Cancellation/No-show Note  Patient Name:  Analisa Bragg  :  1965   Date:  3/13/2020    For today's appointment patient:  [x]  Cancelled  []  Rescheduled appointment  []  No-show     Reason given by patient:  []  Patient ill  []  Conflicting appointment  []  No transportation    [x]  Conflict with work  []  No reason given  [x]  Other:     Comments: Next PT session: Patient to call     Electronically signed by:  Ni Fajardo, [address] [facility]

## 2020-03-18 ENCOUNTER — OFFICE VISIT (OUTPATIENT)
Dept: PHYSICAL MEDICINE AND REHAB | Age: 55
End: 2020-03-18
Payer: COMMERCIAL

## 2020-03-18 ENCOUNTER — HOSPITAL ENCOUNTER (OUTPATIENT)
Age: 55
Discharge: HOME | End: 2020-03-20
Payer: COMMERCIAL

## 2020-03-18 ENCOUNTER — OFFICE VISIT (OUTPATIENT)
Dept: PRIMARY CARE CLINIC | Age: 55
End: 2020-03-18
Payer: COMMERCIAL

## 2020-03-18 VITALS
WEIGHT: 158 LBS | DIASTOLIC BLOOD PRESSURE: 68 MMHG | HEART RATE: 62 BPM | BODY MASS INDEX: 26.98 KG/M2 | SYSTOLIC BLOOD PRESSURE: 106 MMHG | OXYGEN SATURATION: 97 % | HEIGHT: 64 IN

## 2020-03-18 VITALS
BODY MASS INDEX: 26.98 KG/M2 | DIASTOLIC BLOOD PRESSURE: 60 MMHG | HEART RATE: 66 BPM | WEIGHT: 158 LBS | SYSTOLIC BLOOD PRESSURE: 122 MMHG | TEMPERATURE: 98.8 F | HEIGHT: 64 IN | OXYGEN SATURATION: 97 %

## 2020-03-18 LAB
HBA1C MFR BLD: 6.9 % (ref 4–5.6)
TSH SERPL DL<=0.05 MIU/L-ACNC: 5.66 UIU/ML (ref 0.27–4.2)

## 2020-03-18 PROCEDURE — 3046F HEMOGLOBIN A1C LEVEL >9.0%: CPT | Performed by: FAMILY MEDICINE

## 2020-03-18 PROCEDURE — G8427 DOCREV CUR MEDS BY ELIG CLIN: HCPCS | Performed by: FAMILY MEDICINE

## 2020-03-18 PROCEDURE — G8427 DOCREV CUR MEDS BY ELIG CLIN: HCPCS | Performed by: PHYSICAL MEDICINE & REHABILITATION

## 2020-03-18 PROCEDURE — 99214 OFFICE O/P EST MOD 30 MIN: CPT | Performed by: PHYSICAL MEDICINE & REHABILITATION

## 2020-03-18 PROCEDURE — 99215 OFFICE O/P EST HI 40 MIN: CPT | Performed by: FAMILY MEDICINE

## 2020-03-18 PROCEDURE — G8482 FLU IMMUNIZE ORDER/ADMIN: HCPCS | Performed by: FAMILY MEDICINE

## 2020-03-18 PROCEDURE — 36415 COLL VENOUS BLD VENIPUNCTURE: CPT

## 2020-03-18 PROCEDURE — 84443 ASSAY THYROID STIM HORMONE: CPT

## 2020-03-18 PROCEDURE — 3017F COLORECTAL CA SCREEN DOC REV: CPT | Performed by: PHYSICAL MEDICINE & REHABILITATION

## 2020-03-18 PROCEDURE — 2022F DILAT RTA XM EVC RTNOPTHY: CPT | Performed by: FAMILY MEDICINE

## 2020-03-18 PROCEDURE — G8482 FLU IMMUNIZE ORDER/ADMIN: HCPCS | Performed by: PHYSICAL MEDICINE & REHABILITATION

## 2020-03-18 PROCEDURE — 3017F COLORECTAL CA SCREEN DOC REV: CPT | Performed by: FAMILY MEDICINE

## 2020-03-18 PROCEDURE — G8417 CALC BMI ABV UP PARAM F/U: HCPCS | Performed by: FAMILY MEDICINE

## 2020-03-18 PROCEDURE — 1036F TOBACCO NON-USER: CPT | Performed by: FAMILY MEDICINE

## 2020-03-18 PROCEDURE — 83036 HEMOGLOBIN GLYCOSYLATED A1C: CPT

## 2020-03-18 PROCEDURE — 1036F TOBACCO NON-USER: CPT | Performed by: PHYSICAL MEDICINE & REHABILITATION

## 2020-03-18 PROCEDURE — G8417 CALC BMI ABV UP PARAM F/U: HCPCS | Performed by: PHYSICAL MEDICINE & REHABILITATION

## 2020-03-18 RX ORDER — AMLODIPINE BESYLATE 5 MG/1
5 TABLET ORAL DAILY
Qty: 30 TABLET | Refills: 5 | Status: SHIPPED
Start: 2020-03-18 | End: 2020-06-22 | Stop reason: SDUPTHER

## 2020-03-18 RX ORDER — LEVOTHYROXINE SODIUM 88 UG/1
88 TABLET ORAL DAILY
Qty: 30 TABLET | Refills: 5 | Status: SHIPPED
Start: 2020-03-18 | End: 2020-03-19 | Stop reason: DRUGHIGH

## 2020-03-18 RX ORDER — LOSARTAN POTASSIUM 50 MG/1
50 TABLET ORAL DAILY
Qty: 30 TABLET | Refills: 5 | Status: SHIPPED
Start: 2020-03-18 | End: 2020-06-22 | Stop reason: SDUPTHER

## 2020-03-18 RX ORDER — OMEPRAZOLE 40 MG/1
40 CAPSULE, DELAYED RELEASE ORAL DAILY
Qty: 30 CAPSULE | Refills: 5 | Status: SHIPPED
Start: 2020-03-18 | End: 2020-06-22 | Stop reason: SDUPTHER

## 2020-03-18 RX ORDER — SEMAGLUTIDE 1.34 MG/ML
INJECTION, SOLUTION SUBCUTANEOUS
COMMUNITY
End: 2020-06-05

## 2020-03-18 RX ORDER — ARIPIPRAZOLE 5 MG/1
5 TABLET ORAL DAILY
Qty: 30 TABLET | Refills: 3 | Status: SHIPPED
Start: 2020-03-18 | End: 2020-08-31

## 2020-03-18 RX ORDER — LIDOCAINE HCL/PALM OIL
SPRAY, NON-AEROSOL (ML) TOPICAL
COMMUNITY
Start: 2020-03-03 | End: 2020-07-14

## 2020-03-18 RX ORDER — MULTIVIT-MIN/IRON/FOLIC ACID/K 18-600-40
2000 CAPSULE ORAL DAILY
Qty: 30 CAPSULE | Refills: 5 | Status: SHIPPED
Start: 2020-03-18 | End: 2020-06-22 | Stop reason: SDUPTHER

## 2020-03-18 RX ORDER — MULTIVIT-MIN/IRON/FOLIC ACID/K 18-600-40
CAPSULE ORAL
COMMUNITY
End: 2020-03-18 | Stop reason: SDUPTHER

## 2020-03-18 RX ORDER — DOXEPIN HYDROCHLORIDE 50 MG/G
CREAM TOPICAL
COMMUNITY
Start: 2020-02-12 | End: 2021-03-23

## 2020-03-18 RX ORDER — CYCLOBENZAPRINE HCL 10 MG
TABLET ORAL
COMMUNITY
Start: 2020-02-10 | End: 2021-04-20

## 2020-03-18 RX ORDER — OXYBUTYNIN CHLORIDE 5 MG/1
5 TABLET, EXTENDED RELEASE ORAL EVERY EVENING
Qty: 30 TABLET | Refills: 5 | Status: SHIPPED
Start: 2020-03-18 | End: 2020-09-22 | Stop reason: SDUPTHER

## 2020-03-18 RX ORDER — SERTRALINE HYDROCHLORIDE 100 MG/1
100 TABLET, FILM COATED ORAL DAILY
COMMUNITY
End: 2020-03-18 | Stop reason: SDUPTHER

## 2020-03-18 RX ORDER — SERTRALINE HYDROCHLORIDE 100 MG/1
100 TABLET, FILM COATED ORAL DAILY
Qty: 30 TABLET | Refills: 5 | Status: SHIPPED
Start: 2020-03-18 | End: 2020-09-22 | Stop reason: SDUPTHER

## 2020-03-18 RX ORDER — BACLOFEN 10 MG/1
10 TABLET ORAL 3 TIMES DAILY
Qty: 90 TABLET | Refills: 5 | Status: SHIPPED | OUTPATIENT
Start: 2020-03-18 | End: 2020-04-17

## 2020-03-18 RX ORDER — CALCIUM CARBONATE 300MG(750)
TABLET,CHEWABLE ORAL
Qty: 3 EACH | Refills: 5 | Status: SHIPPED | OUTPATIENT
Start: 2020-03-18 | End: 2021-03-23

## 2020-03-18 RX ORDER — FENOFIBRATE 160 MG/1
160 TABLET ORAL DAILY
Qty: 30 TABLET | Refills: 5 | Status: SHIPPED
Start: 2020-03-18 | End: 2020-09-22 | Stop reason: ALTCHOICE

## 2020-03-18 RX ORDER — ATORVASTATIN CALCIUM 10 MG/1
10 TABLET, FILM COATED ORAL DAILY
Qty: 30 TABLET | Refills: 5 | Status: SHIPPED
Start: 2020-03-18 | End: 2020-06-22 | Stop reason: SDUPTHER

## 2020-03-18 ASSESSMENT — ENCOUNTER SYMPTOMS
COUGH: 0
SHORTNESS OF BREATH: 0
NAUSEA: 0
DIARRHEA: 0
BACK PAIN: 1
ABDOMINAL PAIN: 0
VOMITING: 0
CONSTIPATION: 0
WHEEZING: 1

## 2020-03-18 NOTE — PROGRESS NOTES
Musculoskeletal: Positive for back pain. Negative for arthralgias. Skin: Negative for rash. Neurological: Negative for dizziness and headaches. Psychiatric/Behavioral: Positive for dysphoric mood. The patient is not nervous/anxious. All other systems reviewed and are negative. Current Outpatient Medications:     levothyroxine (SYNTHROID) 88 MCG tablet, Take 1 tablet by mouth daily, Disp: 30 tablet, Rfl: 5    sertraline (ZOLOFT) 100 MG tablet, Take 1 tablet by mouth daily, Disp: 30 tablet, Rfl: 5    fenofibrate (TRIGLIDE) 160 MG tablet, Take 1 tablet by mouth daily, Disp: 30 tablet, Rfl: 5    atorvastatin (LIPITOR) 10 MG tablet, Take 1 tablet by mouth daily, Disp: 30 tablet, Rfl: 5    omeprazole (PRILOSEC) 40 MG delayed release capsule, Take 1 capsule by mouth daily, Disp: 30 capsule, Rfl: 5    oxybutynin (DITROPAN XL) 5 MG extended release tablet, Take 1 tablet by mouth every evening, Disp: 30 tablet, Rfl: 5    Cholecalciferol (VITAMIN D) 50 MCG (2000 UT) CAPS capsule, Take 2,000 Units by mouth daily, Disp: 30 capsule, Rfl: 5    ARIPiprazole (ABILIFY) 5 MG tablet, Take 1 tablet by mouth daily, Disp: 30 tablet, Rfl: 3    amLODIPine (NORVASC) 5 MG tablet, Take 1 tablet by mouth daily, Disp: 30 tablet, Rfl: 5    losartan (COZAAR) 50 MG tablet, Take 1 tablet by mouth daily, Disp: 30 tablet, Rfl: 5    Semaglutide, 1 MG/DOSE, (OZEMPIC, 1 MG/DOSE,) 2 MG/1.5ML SOPN, Inject into the skin, Disp: , Rfl:     Tens Unit MISC, by Does not apply route, Disp: 1 each, Rfl: 0    Nerve Stimulator (TENS THERAPY REPLACE BODY PADS) MISC, Use as directed PRN, Disp: 3 each, Rfl: 5    baclofen (LIORESAL) 10 MG tablet, Take 1 tablet by mouth 3 times daily, Disp: 90 tablet, Rfl: 5    ciclopirox (PENLAC) 8 % solution, Apply topically nightly. Remove with acetone every 7 days. , Disp: 6 mL, Rfl: 5    metoclopramide (REGLAN) 5 MG tablet, Take 1 tablet by mouth 3 times daily, Disp: 120 tablet, Rfl: 3    TRESIBA FLEXTOUCH 200 UNIT/ML SOPN, INJECT 30 UNITS SUBCUTANEOUSLY NIGHTLY, Disp: , Rfl: 5    Melatonin 5 MG CHEW, Take 5 mg by mouth nightly, Disp: , Rfl:     aspirin 81 MG EC tablet, Take 81 mg by mouth daily, Disp: , Rfl:     diphenhydrAMINE (ALLERGY RELIEF) 25 MG capsule, Take 25 mg by mouth nightly, Disp: , Rfl:     cyclobenzaprine (FLEXERIL) 10 MG tablet, TAKE 1 TABLET BY MOUTH ONCE DAILY AS NEEDED FOR MUSCLE SPASMS, Disp: , Rfl:     Dermatological Products, Misc.  (KAMDOY) MAYA, , Disp: , Rfl:     diclofenac (SOLARAZE) 3 % gel, , Disp: , Rfl:     doxepin (ZONALON) 5 % cream, , Disp: , Rfl:     Insulin Pen Needle (PEN NEEDLES) 31G X 5 MM MISC, Inject 1 Dose into the skin daily, Disp: 100 each, Rfl: 3    fluticasone-salmeterol (ADVAIR DISKUS) 100-50 MCG/DOSE diskus inhaler, Inhale 1 puff into the lungs every 12 hours Indications: prn, Disp: , Rfl:     albuterol sulfate HFA (PROAIR HFA) 108 (90 Base) MCG/ACT inhaler, Inhale 2 puffs into the lungs every 4 hours as needed for Wheezing, Disp: , Rfl:     No Known Allergies    Past Medical History:   Diagnosis Date    Bilateral hand pain 2/21/2020    Depression     Hyperlipidemia     Hypertension     Hypothyroidism     Type 2 diabetes mellitus without complication (HCC)     Urinary incontinence      Past Surgical History:   Procedure Laterality Date    THYROIDECTOMY, PARTIAL      TONSILLECTOMY      TUBAL LIGATION       Family History   Problem Relation Age of Onset    Pancreatic Cancer Mother         Metastatic to liver    Other Father         Doesn't know biological father    Depression Sister     Lung Cancer Maternal Grandmother     Heart Attack Maternal Grandfather 37     Social History     Socioeconomic History    Marital status:      Spouse name: Not on file    Number of children: Not on file    Years of education: Not on file    Highest education level: Not on file   Occupational History    Not on file   Social Needs    Financial Conjunctiva/sclera: Conjunctivae normal.   Neck:      Musculoskeletal: Normal range of motion and neck supple. Thyroid: No thyromegaly. Cardiovascular:      Rate and Rhythm: Normal rate and regular rhythm. Heart sounds: Normal heart sounds. No murmur. Pulmonary:      Effort: Pulmonary effort is normal. No respiratory distress. Breath sounds: Normal breath sounds. No wheezing. Musculoskeletal:         General: No tenderness or deformity. Right lower leg: No edema. Left lower leg: No edema. Right foot: Normal range of motion. No deformity. Left foot: Normal range of motion. No deformity. Feet:      Right foot:      Skin integrity: Callus and dry skin present. No ulcer, blister, skin breakdown, erythema, warmth or fissure. Toenail Condition: Right toenails are normal.      Left foot:      Skin integrity: Callus and dry skin present. No ulcer, blister, skin breakdown, erythema, warmth or fissure. Toenail Condition: Left toenails are abnormally thick. Fungal disease present. Lymphadenopathy:      Cervical: No cervical adenopathy. Skin:     General: Skin is warm and dry. Findings: No rash. Neurological:      Mental Status: She is alert and oriented to person, place, and time. Psychiatric:         Mood and Affect: Mood normal.         Behavior: Behavior normal.         Thought Content:  Thought content normal.         Labs:  CBC with Differential:    Lab Results   Component Value Date    WBC 7.8 09/24/2019    RBC 4.81 09/24/2019    HGB 14.0 09/24/2019    HCT 42.7 09/24/2019     09/24/2019    MCV 88.8 09/24/2019    MCH 29.1 09/24/2019    MCHC 32.8 09/24/2019    RDW 12.2 09/24/2019    LYMPHOPCT 24.3 09/24/2019    MONOPCT 6.3 09/24/2019    BASOPCT 0.8 09/24/2019    MONOSABS 0.49 09/24/2019    LYMPHSABS 1.89 09/24/2019    EOSABS 0.26 09/24/2019    BASOSABS 0.06 09/24/2019     CMP:    Lab Results   Component Value Date     09/24/2019    K 4.0 09/24/2019    CL 96 09/24/2019    CO2 20 09/24/2019    BUN 22 09/24/2019    CREATININE 0.8 09/24/2019    GFRAA >60 09/24/2019    LABGLOM >60 09/24/2019    GLUCOSE 337 09/24/2019    PROT 7.4 09/24/2019    LABALBU 4.8 09/24/2019    CALCIUM 9.3 09/24/2019    BILITOT 0.5 09/24/2019    ALKPHOS 88 09/24/2019    AST 40 09/24/2019    ALT 40 09/24/2019     HgBA1c:    Lab Results   Component Value Date    LABA1C 9.7 12/13/2019     FLP:    Lab Results   Component Value Date    TRIG 117 12/13/2019    HDL 45 12/13/2019    LDLCALC 92 12/13/2019    LABVLDL 23 12/13/2019     TSH:    Lab Results   Component Value Date    TSH 3.430 12/13/2019        Assessment and Plan:  Delfina Ingram was seen today for annual exam.    Diagnoses and all orders for this visit:    Type 2 diabetes mellitus with complication, without long-term current use of insulin ([facility] [address]  -     Diabetic Foot Exam  Patient getting A1c checked today. Will adjust medications after resulted. Onychomycosis  -     ciclopirox (PENLAC) 8 % solution; Apply topically nightly. Remove with acetone every 7 days. Patient diabetic foot exam normal except for fungal disease. Acquired hypothyroidism  -     levothyroxine (SYNTHROID) 88 MCG tablet; Take 1 tablet by mouth daily  Due for repeat labs    Mixed hyperlipidemia  -     fenofibrate (TRIGLIDE) 160 MG tablet; Take 1 tablet by mouth daily  -     atorvastatin (LIPITOR) 10 MG tablet; Take 1 tablet by mouth daily  Stable    Mild episode of recurrent major depressive disorder (HCC)  -     sertraline (ZOLOFT) 100 MG tablet; Take 1 tablet by mouth daily  -     ARIPiprazole (ABILIFY) 5 MG tablet; Take 1 tablet by mouth daily  Stable    Chronic right-sided low back pain with right-sided sciatica  -     Tens Unit MISC; by Does not apply route  -     Ambulatory referral to Chiropractic  -     Nerve Stimulator (TENS THERAPY REPLACE BODY PADS) MISC; Use as directed PRN  -     baclofen (LIORESAL) 10 MG tablet;  Take 1 tablet by mouth 3 times daily  Will order TENS and try Baclofen for daytime use. Stress incontinence  -     oxybutynin (DITROPAN XL) 5 MG extended release tablet; Take 1 tablet by mouth every evening    Essential hypertension  -     amLODIPine (NORVASC) 5 MG tablet; Take 1 tablet by mouth daily  -     losartan (COZAAR) 50 MG tablet; Take 1 tablet by mouth daily  Stable    Vitamin D deficiency  -     Cholecalciferol (VITAMIN D) 50 MCG (2000 UT) CAPS capsule; Take 2,000 Units by mouth daily    Encounter for screening mammogram for breast cancer  -     [facility] DIGITAL SCREEN W CAD BILATERAL; Future  Due for Mammo at this time. Other orders  -     omeprazole (PRILOSEC) 40 MG delayed release capsule; Take 1 capsule by mouth daily        Return in about 3 months (around 6/18/2020) for Well woman/DM2.       Seen By:  Franki Coyel DO

## 2020-03-18 NOTE — PROGRESS NOTES
[facility] at [facility] [place], [address]  Phone: [phone]  Fax: [phone]    Follow Up Evaluation for Mae Escobedo  : 1965  MRN: [id number]  PCP: Merlin Briggs DO  REF: No ref. provider found  Date of visit: 3/19/20  Last Visit: 1/10/20    As you know, this is a 47 y.o. female with pertinent past medical history of hypertension, hyperlipidemia, hypothyroidism, diabetes (a1c 9.7 on 19), stress incontinence, depression. Chief Complaint   Patient presents with    Lower Back Pain     right sided low back pain - PT was helping during therapy but when she went to work she \"undid\" everything from therapy       HPI:   Patient presents today in follow-up regarding right-sided low back pain. She describes her pain as a pinching sensation. Currently, she describes the back pain as a 6/10, but typically 8-11/10. Pain is alleviated by rest, aggravated by standing and work at [employer]. She did complete 8 sessions of PT which was beneficial for her pain relief; however, her pain is returned since she returned to work. Pain is constant. There is no radiation of pain into lower extremities. There is no numbness or tingling. There is no neurological changes. There is no bowel or bladder incontinence. She has never tried chiropractic. Most recent hemoglobin A1c was 9.7 on 2019. To note, she did have hemoglobin A1c drawn by PCP on day of this appointment which has not been resulted yet. Right sacroiliac joint steroid injection can be considered if hemoglobin A1c drops below 7.0. Diagnostic Studies:  - No imaging studies to review    No Known Allergies    Current Outpatient Medications   Medication Sig Dispense Refill    cyclobenzaprine (FLEXERIL) 10 MG tablet TAKE 1 TABLET BY MOUTH ONCE DAILY AS NEEDED FOR MUSCLE SPASMS      Dermatological Products, Misc.  (KAMDOY) EMUL       diclofenac (SOLARAZE) 3 % gel sciatica    2. Sacroiliac pain    3. Pain of right sacroiliac joint        Recommendations:  1. Discussion: I have discussed the natural history of the above diagnoses with her in detail, with more than 1/2 of the visit spent counseling her on the pathophysiology and treatment options. Discussed continued conservative treatment options for patient's right-sided low back pain which includes gentle analgesics, physical therapy, the addition of chiropractics. We can attempt ultrasound-guided sacroiliac joint steroid injection on the right when hemoglobin A1c is below 7.0.  2. Activity Modification: Patient to continue being as physically active as possible while avoiding complete inactivity. No current restrictions placed. 3. Medications: Acetaminophen 1000 mg 3 times daily PRN for pain. Instructed to take no more than 3000 mg daily or with alcohol. Patient had upset stomach with NSAIDs. 4. Referrals: Agree with PCPs referral to chiropractic medicine. No surgical referral needed at this point. 5. Images: We will order lumbar x-rays due to failure of conservative treatment up to this point. 6. Labs: None today. 7. DME: None today. 8. Injection: None indicated today with hemoglobin A1c of 9.7. Can consider sacroiliac joint steroid injection when blood sugar better controlled. 9. Follow-up: 2 months. At that time we will review progress with above interventions. The patient was educated about the diagnosis, prognosis, indications, risks and benefits of treatment. An opportunity to ask questions was given to the patient and questions were answered. The patient agreed to proceed with the recommended treatment as described above. Sincerely,     Gary Norris., DO  Physical Medicine and Rehabilitation     Please note that the above documentation was prepared using voice recognition software.   Every attempt was made to ensure accuracy but there may be spelling, grammatical, and contextual

## 2020-03-19 ENCOUNTER — HOSPITAL ENCOUNTER (OUTPATIENT)
Dept: PHYSICAL THERAPY | Age: 55
Setting detail: THERAPIES SERIES
Discharge: HOME | End: 2020-03-19
Payer: COMMERCIAL

## 2020-03-19 RX ORDER — LEVOTHYROXINE SODIUM 0.1 MG/1
100 TABLET ORAL DAILY
Qty: 90 TABLET | Refills: 1 | Status: SHIPPED
Start: 2020-03-19 | End: 2020-06-22

## 2020-03-27 RX ORDER — ALBUTEROL SULFATE 90 UG/1
2 AEROSOL, METERED RESPIRATORY (INHALATION) EVERY 4 HOURS PRN
Qty: 1 INHALER | Refills: 3 | Status: SHIPPED
Start: 2020-03-27 | End: 2021-08-18 | Stop reason: ALTCHOICE

## 2020-03-27 NOTE — TELEPHONE ENCOUNTER
Name of Medication(s) Requested:  albuterol sulfate HFA (PROAIR HFA) 108 (90 Base) MCG/ACT inhaler    Pharmacy Requested:   [facility]    Medication(s) pended? [x] Yes  [] No    Last Appointment:  3/18/2020    Future appts:  Future Appointments   Date Time Provider Whitney Disla   5/20/2020  9:00 AM DO VAMSI Leigh PMR [facility]   6/22/2020  8:00 AM DO JONATHAN Cuello  [facility]        Does patient need call back?   [x] Yes  [] No

## 2020-05-18 ENCOUNTER — OFFICE VISIT (OUTPATIENT)
Dept: CHIROPRACTIC MEDICINE | Age: 55
End: 2020-05-18
Payer: COMMERCIAL

## 2020-05-18 VITALS
HEART RATE: 63 BPM | WEIGHT: 160 LBS | SYSTOLIC BLOOD PRESSURE: 120 MMHG | BODY MASS INDEX: 27.31 KG/M2 | TEMPERATURE: 97.6 F | HEIGHT: 64 IN | DIASTOLIC BLOOD PRESSURE: 62 MMHG

## 2020-05-18 PROCEDURE — G8427 DOCREV CUR MEDS BY ELIG CLIN: HCPCS | Performed by: CHIROPRACTOR

## 2020-05-18 PROCEDURE — G8417 CALC BMI ABV UP PARAM F/U: HCPCS | Performed by: CHIROPRACTOR

## 2020-05-18 PROCEDURE — 1036F TOBACCO NON-USER: CPT | Performed by: CHIROPRACTOR

## 2020-05-18 PROCEDURE — 98940 CHIROPRACT MANJ 1-2 REGIONS: CPT | Performed by: CHIROPRACTOR

## 2020-05-18 PROCEDURE — 3017F COLORECTAL CA SCREEN DOC REV: CPT | Performed by: CHIROPRACTOR

## 2020-05-18 PROCEDURE — 99203 OFFICE O/P NEW LOW 30 MIN: CPT | Performed by: CHIROPRACTOR

## 2020-05-18 ASSESSMENT — ENCOUNTER SYMPTOMS
BACK PAIN: 1
BOWEL INCONTINENCE: 0

## 2020-05-18 NOTE — PROGRESS NOTES
[address] [facility]    20  Juliane Ceja : 1965 Sex: female  Age: 47 y.o. Patient was referred by Yudelka Thompson DO    Chief Complaint   Patient presents with    Back Pain       Back Pain   This is a chronic problem. The current episode started more than 1 year ago. The problem occurs constantly. The problem has been waxing and waning since onset. The pain is present in the sacro-iliac. The quality of the pain is described as aching and shooting. The pain does not radiate. The pain is at a severity of 4/10. The symptoms are aggravated by bending (lifting). Pertinent negatives include no bladder incontinence, bowel incontinence, fever, leg pain or weakness. She has tried home exercises and heat (TENS, physical therapy, topical) for the symptoms. Red Flags:  none    Review of Systems   Constitutional: Negative for fever. Gastrointestinal: Negative for bowel incontinence. Genitourinary: Negative for bladder incontinence. Musculoskeletal: Positive for back pain. Neurological: Negative for weakness. Current Outpatient Medications:     albuterol sulfate HFA (PROAIR HFA) 108 (90 Base) MCG/ACT inhaler, Inhale 2 puffs into the lungs every 4 hours as needed for Wheezing, Disp: 1 Inhaler, Rfl: 3    fluticasone-salmeterol (ADVAIR DISKUS) 250-50 MCG/DOSE AEPB, Inhale 1 puff into the lungs every 12 hours, Disp: 3 Inhaler, Rfl: 2    levothyroxine (SYNTHROID) 100 MCG tablet, Take 1 tablet by mouth daily, Disp: 90 tablet, Rfl: 1    cyclobenzaprine (FLEXERIL) 10 MG tablet, TAKE 1 TABLET BY MOUTH ONCE DAILY AS NEEDED FOR MUSCLE SPASMS, Disp: , Rfl:     Dermatological Products, Misc.  (KAMDOY) MAYA, , Disp: , Rfl:     diclofenac (SOLARAZE) 3 % gel, , Disp: , Rfl:     doxepin (ZONALON) 5 % cream, , Disp: , Rfl:     sertraline (ZOLOFT) 100 MG tablet, Take 1 tablet by mouth daily, Disp: 30 tablet, Rfl: 5    fenofibrate (TRIGLIDE) 160 MG tablet, Take 1 tablet by mouth daily, Disp: 30 tablet, Relation Age of Onset    Pancreatic Cancer Mother         Metastatic to liver    Other Father         Doesn't know biological father    Depression Sister     Lung Cancer Maternal Grandmother     Heart Attack Maternal Grandfather 37     Past Surgical History:   Procedure Laterality Date    THYROIDECTOMY, PARTIAL      TONSILLECTOMY      TUBAL LIGATION       Social History     Socioeconomic History    Marital status:      Spouse name: Not on file    Number of children: Not on file    Years of education: Not on file    Highest education level: Not on file   Occupational History    Not on file   Social Needs    Financial resource strain: Not on file    Food insecurity     Worry: Not on file     Inability: Not on file    Transportation needs     Medical: Not on file     Non-medical: Not on file   Tobacco Use    Smoking status: Former Smoker     Packs/day: 2.00     Years: 20.00     Pack years: 40.00     Types: Cigarettes     Last attempt to quit:      Years since quittin.3    Smokeless tobacco: Never Used   Substance and Sexual Activity    Alcohol use: Not on file    Drug use: Not on file    Sexual activity: Not on file   Lifestyle    Physical activity     Days per week: Not on file     Minutes per session: Not on file    Stress: Not on file   Relationships    Social connections     Talks on phone: Not on file     Gets together: Not on file     Attends Hinduism service: Not on file     Active member of club or organization: Not on file     Attends meetings of clubs or organizations: Not on file     Relationship status: Not on file    Intimate partner violence     Fear of current or ex partner: Not on file     Emotionally abused: Not on file     Physically abused: Not on file     Forced sexual activity: Not on file   Other Topics Concern    Not on file   Social History Narrative    Not on file       Vitals:    20 0839   BP: 120/62   Pulse: 63   Temp: 97.6 °F (36.4 °C)

## 2020-05-27 ENCOUNTER — TELEPHONE (OUTPATIENT)
Dept: PHYSICAL MEDICINE AND REHAB | Age: 55
End: 2020-05-27

## 2020-05-27 ENCOUNTER — OFFICE VISIT (OUTPATIENT)
Dept: CHIROPRACTIC MEDICINE | Age: 55
End: 2020-05-27
Payer: COMMERCIAL

## 2020-05-27 VITALS — RESPIRATION RATE: 14 BRPM

## 2020-05-27 PROCEDURE — 98940 CHIROPRACT MANJ 1-2 REGIONS: CPT | Performed by: CHIROPRACTOR

## 2020-05-27 NOTE — TELEPHONE ENCOUNTER
Let message to call and schedule a follow up at [facility]. Due to [facility] office being closed.   Electronically signed by Sandra Farah MA on 5/27/2020 at 8:38 AM

## 2020-05-27 NOTE — PATIENT INSTRUCTIONS
1. Knee push exercise -- 1 leg bent, 1 straight -- push knee to wall away from you. 3 sets of 5 repetitions, both sides     2. Knee to chest stretch - 30 sec bilateral      3.  Knee to chest - \"push away\" 5 sets of 5 sec holds -- right side only

## 2020-05-28 ENCOUNTER — TELEPHONE (OUTPATIENT)
Dept: PHYSICAL MEDICINE AND REHAB | Age: 55
End: 2020-05-28

## 2020-05-28 NOTE — TELEPHONE ENCOUNTER
2nd attempt - left message to call for a follow up at [facility].   Electronically signed by Chucho Rios MA on 5/28/2020 at 12:58 PM

## 2020-06-01 ENCOUNTER — TELEPHONE (OUTPATIENT)
Dept: PHYSICAL MEDICINE AND REHAB | Age: 55
End: 2020-06-01

## 2020-06-03 ENCOUNTER — OFFICE VISIT (OUTPATIENT)
Dept: CHIROPRACTIC MEDICINE | Age: 55
End: 2020-06-03
Payer: COMMERCIAL

## 2020-06-03 VITALS — RESPIRATION RATE: 14 BRPM | OXYGEN SATURATION: 97 % | HEART RATE: 60 BPM

## 2020-06-03 PROCEDURE — 98940 CHIROPRACT MANJ 1-2 REGIONS: CPT | Performed by: CHIROPRACTOR

## 2020-06-03 NOTE — PROGRESS NOTES
6/3/20  Urban Rater : 1965 Sex: female  Age: 47 y.o. Chief Complaint   Patient presents with    Back Pain       HPI:   She continues with right greater than left low back pain. She believes it is essentially unchanged. She has been doing her exercises. She admits to not using the ice is much as she just does not tolerate it well. No new issues today. She again notes that the lifting carrying and walking on hard surfaces at work seem to aggravate her back. Current Outpatient Medications:     albuterol sulfate HFA (PROAIR HFA) 108 (90 Base) MCG/ACT inhaler, Inhale 2 puffs into the lungs every 4 hours as needed for Wheezing, Disp: 1 Inhaler, Rfl: 3    fluticasone-salmeterol (ADVAIR DISKUS) 250-50 MCG/DOSE AEPB, Inhale 1 puff into the lungs every 12 hours, Disp: 3 Inhaler, Rfl: 2    levothyroxine (SYNTHROID) 100 MCG tablet, Take 1 tablet by mouth daily, Disp: 90 tablet, Rfl: 1    cyclobenzaprine (FLEXERIL) 10 MG tablet, TAKE 1 TABLET BY MOUTH ONCE DAILY AS NEEDED FOR MUSCLE SPASMS, Disp: , Rfl:     Dermatological Products, Misc.  (KAMJITENDRA) MAYA, , Disp: , Rfl:     diclofenac (SOLARAZE) 3 % gel, , Disp: , Rfl:     doxepin (ZONALON) 5 % cream, , Disp: , Rfl:     sertraline (ZOLOFT) 100 MG tablet, Take 1 tablet by mouth daily, Disp: 30 tablet, Rfl: 5    fenofibrate (TRIGLIDE) 160 MG tablet, Take 1 tablet by mouth daily, Disp: 30 tablet, Rfl: 5    atorvastatin (LIPITOR) 10 MG tablet, Take 1 tablet by mouth daily, Disp: 30 tablet, Rfl: 5    omeprazole (PRILOSEC) 40 MG delayed release capsule, Take 1 capsule by mouth daily, Disp: 30 capsule, Rfl: 5    oxybutynin (DITROPAN XL) 5 MG extended release tablet, Take 1 tablet by mouth every evening, Disp: 30 tablet, Rfl: 5    Cholecalciferol (VITAMIN D) 50 MCG (2000 UT) CAPS capsule, Take 2,000 Units by mouth daily, Disp: 30 capsule, Rfl: 5    ARIPiprazole (ABILIFY) 5 MG tablet, Take 1 tablet by mouth daily, Disp: 30 tablet, Rfl: 3   amLODIPine (NORVASC) 5 MG tablet, Take 1 tablet by mouth daily, Disp: 30 tablet, Rfl: 5    losartan (COZAAR) 50 MG tablet, Take 1 tablet by mouth daily, Disp: 30 tablet, Rfl: 5    Semaglutide, 1 MG/DOSE, (OZEMPIC, 1 MG/DOSE,) 2 MG/1.5ML SOPN, Inject into the skin, Disp: , Rfl:     Tens Unit MISC, by Does not apply route, Disp: 1 each, Rfl: 0    Nerve Stimulator (TENS THERAPY REPLACE BODY PADS) MISC, Use as directed PRN, Disp: 3 each, Rfl: 5    ciclopirox (PENLAC) 8 % solution, Apply topically nightly. Remove with acetone every 7 days. , Disp: 6 mL, Rfl: 5    Insulin Pen Needle (PEN NEEDLES) 31G X 5 MM MISC, Inject 1 Dose into the skin daily, Disp: 100 each, Rfl: 3    metoclopramide (REGLAN) 5 MG tablet, Take 1 tablet by mouth 3 times daily, Disp: 120 tablet, Rfl: 3    TRESIBA FLEXTOUCH 200 UNIT/ML SOPN, INJECT 30 UNITS SUBCUTANEOUSLY NIGHTLY, Disp: , Rfl: 5    Melatonin 5 MG CHEW, Take 5 mg by mouth nightly, Disp: , Rfl:     aspirin 81 MG EC tablet, Take 81 mg by mouth daily, Disp: , Rfl:     diphenhydrAMINE (ALLERGY RELIEF) 25 MG capsule, Take 25 mg by mouth nightly, Disp: , Rfl:     Exam: Point tenderness right SI joint. There are hypertonic and tender fibers noted today in the bilateral lumbar paraspinal muscles. Joint fixation is noted with motion screening at bilateral SI joints L3-4. Hemalatha was seen today for back pain. Diagnoses and all orders for this visit:    Segmental and somatic dysfunction of pelvic region    Sprain of sacroiliac region, initial encounter    Segmental and somatic dysfunction of lumbar region    Chronic right-sided low back pain with right-sided sciatica        Treatment Plan: Rodriguez flexion distraction manipulation today at L3, protocol 1. Diversified manipulation to the SI joints. Tolerated well. Today's visit 3, I am not discouraged at this point.   If after 5-6 visit she has not improved, will consider alternative therapy      Seen By:  Delaney Perez

## 2020-06-10 ENCOUNTER — HOSPITAL ENCOUNTER (OUTPATIENT)
Dept: GENERAL RADIOLOGY | Age: 55
Discharge: HOME | End: 2020-06-12
Payer: COMMERCIAL

## 2020-06-10 PROCEDURE — 77063 BREAST TOMOSYNTHESIS BI: CPT

## 2020-06-11 RX ORDER — PEN NEEDLE, DIABETIC 30 GX3/16"
1 NEEDLE, DISPOSABLE MISCELLANEOUS DAILY
Qty: 100 EACH | Refills: 3 | Status: SHIPPED
Start: 2020-06-11 | End: 2021-06-28

## 2020-06-16 ENCOUNTER — OFFICE VISIT (OUTPATIENT)
Dept: PHYSICAL MEDICINE AND REHAB | Age: 55
End: 2020-06-16
Payer: COMMERCIAL

## 2020-06-16 VITALS
OXYGEN SATURATION: 97 % | HEART RATE: 63 BPM | BODY MASS INDEX: 26.46 KG/M2 | HEIGHT: 64 IN | SYSTOLIC BLOOD PRESSURE: 116 MMHG | WEIGHT: 155 LBS | DIASTOLIC BLOOD PRESSURE: 70 MMHG

## 2020-06-16 PROCEDURE — 1036F TOBACCO NON-USER: CPT | Performed by: PHYSICAL MEDICINE & REHABILITATION

## 2020-06-16 PROCEDURE — 3017F COLORECTAL CA SCREEN DOC REV: CPT | Performed by: PHYSICAL MEDICINE & REHABILITATION

## 2020-06-16 PROCEDURE — G8417 CALC BMI ABV UP PARAM F/U: HCPCS | Performed by: PHYSICAL MEDICINE & REHABILITATION

## 2020-06-16 PROCEDURE — 99213 OFFICE O/P EST LOW 20 MIN: CPT | Performed by: PHYSICAL MEDICINE & REHABILITATION

## 2020-06-16 PROCEDURE — G8427 DOCREV CUR MEDS BY ELIG CLIN: HCPCS | Performed by: PHYSICAL MEDICINE & REHABILITATION

## 2020-06-16 NOTE — PROGRESS NOTES
flexors                   5/5 5/5              NRBML                          0/5                                           5/9              ZO                               1/2                                           5/6              WSI                             3/4                                           1/7              HDMSRNF Flexor              5/5 5/5     Sensory(LT):                 Sensory was intact to bilateral L3-S1                                                     R                                              L  Reflex Knee Jerk:                   2                                              2  Medial Hamstring                    2                                              2  Ankle Jerk:                              1                                              8        Provacative testing:                                       R                      L  Arjun's Finger (x2)      +                      neg      Gait: Reciprocal pattern with no assistive device, nonantalgic, appropriate step length and toe clearance, appropriate speed, no Trendelenburg    Impression:   Jose R Best is a 47 y.o. female who presents with chronic right-sided back pain secondary to axial low back pain with sacroiliac joint pain on the right. 1. Chronic right-sided low back pain without sciatica    2. Sacroiliac pain    3. Pain of right sacroiliac joint        Recommendations:  1. Discussion: I have discussed the natural history of the above diagnoses with her in detail, with more than 1/2 of the visit spent counseling her on the pathophysiology and treatment options. 2. Activity Modification: Patient to continue being as physically active as possible while avoiding complete inactivity. No current restrictions placed. 3. Medications: Acetaminophen 1000 mg 3 times daily PRN for pain.   Instructed to take no more than

## 2020-06-22 ENCOUNTER — HOSPITAL ENCOUNTER (OUTPATIENT)
Age: 55
Discharge: HOME | End: 2020-06-24
Payer: COMMERCIAL

## 2020-06-22 ENCOUNTER — OFFICE VISIT (OUTPATIENT)
Dept: CHIROPRACTIC MEDICINE | Age: 55
End: 2020-06-22
Payer: COMMERCIAL

## 2020-06-22 ENCOUNTER — OFFICE VISIT (OUTPATIENT)
Dept: PRIMARY CARE CLINIC | Age: 55
End: 2020-06-22
Payer: COMMERCIAL

## 2020-06-22 VITALS
DIASTOLIC BLOOD PRESSURE: 56 MMHG | HEIGHT: 64 IN | OXYGEN SATURATION: 95 % | TEMPERATURE: 97.9 F | SYSTOLIC BLOOD PRESSURE: 124 MMHG | BODY MASS INDEX: 26.29 KG/M2 | HEART RATE: 58 BPM | WEIGHT: 154 LBS

## 2020-06-22 VITALS — RESPIRATION RATE: 14 BRPM

## 2020-06-22 PROBLEM — M79.641 BILATERAL HAND PAIN: Status: RESOLVED | Noted: 2020-02-21 | Resolved: 2020-06-22

## 2020-06-22 PROBLEM — M79.642 BILATERAL HAND PAIN: Status: RESOLVED | Noted: 2020-02-21 | Resolved: 2020-06-22

## 2020-06-22 LAB
ALBUMIN SERPL-MCNC: 5 G/DL (ref 3.5–5.2)
ALP BLD-CCNC: 57 U/L (ref 35–104)
ALT SERPL-CCNC: 19 U/L (ref 0–32)
ANION GAP SERPL CALCULATED.3IONS-SCNC: 17 MMOL/L (ref 7–16)
AST SERPL-CCNC: 30 U/L (ref 0–31)
BASOPHILS ABSOLUTE: 0.04 E9/L (ref 0–0.2)
BASOPHILS RELATIVE PERCENT: 0.5 % (ref 0–2)
BILIRUB SERPL-MCNC: 0.4 MG/DL (ref 0–1.2)
BUN BLDV-MCNC: 26 MG/DL (ref 6–20)
CALCIUM SERPL-MCNC: 9.5 MG/DL (ref 8.6–10.2)
CHLORIDE BLD-SCNC: 102 MMOL/L (ref 98–107)
CHOLESTEROL, TOTAL: 133 MG/DL (ref 0–199)
CO2: 22 MMOL/L (ref 22–29)
CREAT SERPL-MCNC: 1.1 MG/DL (ref 0.5–1)
CREATININE URINE: 113 MG/DL (ref 29–226)
EOSINOPHILS ABSOLUTE: 0.16 E9/L (ref 0.05–0.5)
EOSINOPHILS RELATIVE PERCENT: 2.1 % (ref 0–6)
GFR AFRICAN AMERICAN: >60
GFR NON-AFRICAN AMERICAN: 52 ML/MIN/1.73
GLUCOSE BLD-MCNC: 129 MG/DL (ref 74–99)
HBA1C MFR BLD: 6.1 % (ref 4–5.6)
HCT VFR BLD CALC: 42.7 % (ref 34–48)
HDLC SERPL-MCNC: 42 MG/DL
HEMOGLOBIN: 13.3 G/DL (ref 11.5–15.5)
IMMATURE GRANULOCYTES #: 0.03 E9/L
IMMATURE GRANULOCYTES %: 0.4 % (ref 0–5)
LDL CHOLESTEROL CALCULATED: 69 MG/DL (ref 0–99)
LYMPHOCYTES ABSOLUTE: 1.69 E9/L (ref 1.5–4)
LYMPHOCYTES RELATIVE PERCENT: 22.4 % (ref 20–42)
MCH RBC QN AUTO: 28.7 PG (ref 26–35)
MCHC RBC AUTO-ENTMCNC: 31.1 % (ref 32–34.5)
MCV RBC AUTO: 92 FL (ref 80–99.9)
MICROALBUMIN UR-MCNC: <12 MG/L
MICROALBUMIN/CREAT UR-RTO: ABNORMAL (ref 0–30)
MONOCYTES ABSOLUTE: 0.42 E9/L (ref 0.1–0.95)
MONOCYTES RELATIVE PERCENT: 5.6 % (ref 2–12)
NEUTROPHILS ABSOLUTE: 5.22 E9/L (ref 1.8–7.3)
NEUTROPHILS RELATIVE PERCENT: 69 % (ref 43–80)
PDW BLD-RTO: 12.9 FL (ref 11.5–15)
PLATELET # BLD: 152 E9/L (ref 130–450)
PMV BLD AUTO: 12.5 FL (ref 7–12)
POTASSIUM SERPL-SCNC: 4.5 MMOL/L (ref 3.5–5)
RBC # BLD: 4.64 E12/L (ref 3.5–5.5)
SODIUM BLD-SCNC: 141 MMOL/L (ref 132–146)
TOTAL PROTEIN: 7.3 G/DL (ref 6.4–8.3)
TRIGL SERPL-MCNC: 110 MG/DL (ref 0–149)
TSH SERPL DL<=0.05 MIU/L-ACNC: 2.22 UIU/ML (ref 0.27–4.2)
URIC ACID, SERUM: 4 MG/DL (ref 2.4–5.7)
VITAMIN D 25-HYDROXY: 33 NG/ML (ref 30–100)
VLDLC SERPL CALC-MCNC: 22 MG/DL
WBC # BLD: 7.6 E9/L (ref 4.5–11.5)

## 2020-06-22 PROCEDURE — 36415 COLL VENOUS BLD VENIPUNCTURE: CPT

## 2020-06-22 PROCEDURE — 99396 PREV VISIT EST AGE 40-64: CPT | Performed by: FAMILY MEDICINE

## 2020-06-22 PROCEDURE — 87624 HPV HI-RISK TYP POOLED RSLT: CPT

## 2020-06-22 PROCEDURE — 82306 VITAMIN D 25 HYDROXY: CPT

## 2020-06-22 PROCEDURE — 80053 COMPREHEN METABOLIC PANEL: CPT

## 2020-06-22 PROCEDURE — 98940 CHIROPRACT MANJ 1-2 REGIONS: CPT | Performed by: CHIROPRACTOR

## 2020-06-22 PROCEDURE — 82044 UR ALBUMIN SEMIQUANTITATIVE: CPT

## 2020-06-22 PROCEDURE — 83036 HEMOGLOBIN GLYCOSYLATED A1C: CPT

## 2020-06-22 PROCEDURE — 84443 ASSAY THYROID STIM HORMONE: CPT

## 2020-06-22 PROCEDURE — G0123 SCREEN CERV/VAG THIN LAYER: HCPCS

## 2020-06-22 PROCEDURE — 84550 ASSAY OF BLOOD/URIC ACID: CPT

## 2020-06-22 PROCEDURE — 80061 LIPID PANEL: CPT

## 2020-06-22 PROCEDURE — 85025 COMPLETE CBC W/AUTO DIFF WBC: CPT

## 2020-06-22 PROCEDURE — 82570 ASSAY OF URINE CREATININE: CPT

## 2020-06-22 PROCEDURE — 99213 OFFICE O/P EST LOW 20 MIN: CPT | Performed by: FAMILY MEDICINE

## 2020-06-22 RX ORDER — MULTIVIT-MIN/IRON/FOLIC ACID/K 18-600-40
2000 CAPSULE ORAL DAILY
Qty: 30 CAPSULE | Refills: 5 | Status: SHIPPED
Start: 2020-06-22 | End: 2020-09-22 | Stop reason: SDUPTHER

## 2020-06-22 RX ORDER — LEVOTHYROXINE SODIUM 0.1 MG/1
100 TABLET ORAL DAILY
COMMUNITY
End: 2020-06-22 | Stop reason: SDUPTHER

## 2020-06-22 RX ORDER — LOSARTAN POTASSIUM 50 MG/1
50 TABLET ORAL DAILY
Qty: 30 TABLET | Refills: 5 | Status: SHIPPED
Start: 2020-06-22 | End: 2020-09-09 | Stop reason: SDUPTHER

## 2020-06-22 RX ORDER — SEMAGLUTIDE 1.34 MG/ML
1 INJECTION, SOLUTION SUBCUTANEOUS WEEKLY
Qty: 6 PEN | Refills: 1 | Status: SHIPPED
Start: 2020-06-22 | End: 2020-09-22 | Stop reason: SDUPTHER

## 2020-06-22 RX ORDER — INSULIN DEGLUDEC 200 U/ML
INJECTION, SOLUTION SUBCUTANEOUS
Qty: 3 PEN | Refills: 5 | Status: SHIPPED
Start: 2020-06-22 | End: 2020-09-22 | Stop reason: SDUPTHER

## 2020-06-22 RX ORDER — OMEPRAZOLE 40 MG/1
40 CAPSULE, DELAYED RELEASE ORAL DAILY
Qty: 30 CAPSULE | Refills: 5 | Status: SHIPPED
Start: 2020-06-22 | End: 2020-09-09 | Stop reason: SDUPTHER

## 2020-06-22 RX ORDER — METOCLOPRAMIDE 5 MG/1
5 TABLET ORAL 3 TIMES DAILY
Qty: 120 TABLET | Refills: 3 | Status: SHIPPED
Start: 2020-06-22 | End: 2020-11-17 | Stop reason: SDUPTHER

## 2020-06-22 RX ORDER — ATORVASTATIN CALCIUM 10 MG/1
10 TABLET, FILM COATED ORAL DAILY
Qty: 30 TABLET | Refills: 5 | Status: SHIPPED
Start: 2020-06-22 | End: 2020-09-02 | Stop reason: SDUPTHER

## 2020-06-22 RX ORDER — AMLODIPINE BESYLATE 5 MG/1
5 TABLET ORAL DAILY
Qty: 30 TABLET | Refills: 5 | Status: SHIPPED
Start: 2020-06-22 | End: 2020-09-02 | Stop reason: SDUPTHER

## 2020-06-22 RX ORDER — LEVOTHYROXINE SODIUM 0.1 MG/1
100 TABLET ORAL DAILY
Qty: 30 TABLET | Refills: 5 | Status: SHIPPED
Start: 2020-06-22 | End: 2020-09-09 | Stop reason: SDUPTHER

## 2020-06-22 ASSESSMENT — ENCOUNTER SYMPTOMS
DIARRHEA: 0
CONSTIPATION: 0
VOMITING: 0
COUGH: 0
SHORTNESS OF BREATH: 0
NAUSEA: 0
WHEEZING: 0
BACK PAIN: 1
ABDOMINAL PAIN: 0

## 2020-06-22 NOTE — PROGRESS NOTES
20  Josh  : 1965 Sex: female  Age: 47 y.o. Chief Complaint   Patient presents with    Gynecologic Exam       HPI:  47 y.o. female presents today for Well Woman exam with Pap smear. Patient's chart, medical, surgical and medication history all reviewed. Well woman exam  Her last pap smear was 4 years ago and was normal.  Last mammogram was 6/10/2020 and was normal.  She is  up to date with colonoscopy. Patient does not have issues with vaginal discharge, itching or odor. Last LMP 6 years ago. Patient does not have abnormal vaginal bleeding or pain with periods. Patient does have a family hx of breast, uterine, ovarian or cervical cancers. Patient does have bowel or bladder problems. She is  sexually active. She has 1 partners and does not use protection. She does not smoke. No other complaints or concerns today. Diabetes Mellitus  47 y.o. female presents for follow up of type 2 diabetes. Current diabetic medications include: oral agent (monotherapy): glipizide (Glucotrol), insulin injections: Tresiba 30U QHS and Ozempic 1 mg weekly. Previous medications tried include: oral agents (dual therapy): metformin (generic), Steglatro, but failed due to nausea, vomiting and abdominal pain (Metformin) and cost/coverage (Steglatro). Most recent HgA1c was 6.9% (2020) which is significantly improved from previous value of 9.7% (2019)--10.5% (2019). Did not get labs rechecked yet. No issues with low blood sugars. No signs of nephropathy, neuropathy or retinopathy. Had eye exam completed in - no retinopathy.     Hypertension   The patient presents today for follow up of HTN. The problem is well controlled. Risk factors for coronary artery disease include Age > 27, HTN, diabetes and elevated cholesterol. Current treatments include amlodipine (Norvasc) and losartan (Cozaar). The patient is compliant all of the time.   Lifestyle changes the patient There is no mass. Genitourinary:     Exam position: Supine. Labia:         Right: No rash or lesion. Left: No rash or lesion. Vagina: No vaginal discharge, erythema, tenderness or bleeding. Cervix: No cervical motion tenderness, discharge or friability. Uterus: Not enlarged and not tender. Adnexa:         Right: No mass, tenderness or fullness. Left: No mass, tenderness or fullness. Musculoskeletal: Normal range of motion. General: No tenderness or deformity. Right lower leg: No edema. Left lower leg: No edema. Lymphadenopathy:      Cervical: No cervical adenopathy. Skin:     General: Skin is warm and dry. Findings: No rash. Neurological:      General: No focal deficit present. Mental Status: She is alert and oriented to person, place, and time. Psychiatric:         Mood and Affect: Mood normal.         Behavior: Behavior normal.         Thought Content:  Thought content normal.         Labs:  CBC with Differential:    Lab Results   Component Value Date    WBC 7.8 09/24/2019    RBC 4.81 09/24/2019    HGB 14.0 09/24/2019    HCT 42.7 09/24/2019     09/24/2019    MCV 88.8 09/24/2019    MCH 29.1 09/24/2019    MCHC 32.8 09/24/2019    RDW 12.2 09/24/2019    LYMPHOPCT 24.3 09/24/2019    MONOPCT 6.3 09/24/2019    BASOPCT 0.8 09/24/2019    MONOSABS 0.49 09/24/2019    LYMPHSABS 1.89 09/24/2019    EOSABS 0.26 09/24/2019    BASOSABS 0.06 09/24/2019     CMP:    Lab Results   Component Value Date     09/24/2019    K 4.0 09/24/2019    CL 96 09/24/2019    CO2 20 09/24/2019    BUN 22 09/24/2019    CREATININE 0.8 09/24/2019    GFRAA >60 09/24/2019    LABGLOM >60 09/24/2019    GLUCOSE 337 09/24/2019    PROT 7.4 09/24/2019    LABALBU 4.8 09/24/2019    CALCIUM 9.3 09/24/2019    BILITOT 0.5 09/24/2019    ALKPHOS 88 09/24/2019    AST 40 09/24/2019    ALT 40 09/24/2019     HgBA1c:    Lab Results   Component Value Date    LABA1C 6.9 03/18/2020

## 2020-06-22 NOTE — PROGRESS NOTES
  Dermatological Products, [facility]. (KAMDOLUIS) MAYA, , Disp: , Rfl:     diclofenac (SOLARAZE) 3 % gel, , Disp: , Rfl:     doxepin (ZONALON) 5 % cream, , Disp: , Rfl:     sertraline (ZOLOFT) 100 MG tablet, Take 1 tablet by mouth daily, Disp: 30 tablet, Rfl: 5    fenofibrate (TRIGLIDE) 160 MG tablet, Take 1 tablet by mouth daily, Disp: 30 tablet, Rfl: 5    oxybutynin (DITROPAN XL) 5 MG extended release tablet, Take 1 tablet by mouth every evening, Disp: 30 tablet, Rfl: 5    ARIPiprazole (ABILIFY) 5 MG tablet, Take 1 tablet by mouth daily, Disp: 30 tablet, Rfl: 3    Tens Unit MISC, by Does not apply route, Disp: 1 each, Rfl: 0    Nerve Stimulator (TENS THERAPY REPLACE BODY PADS) MISC, Use as directed PRN, Disp: 3 each, Rfl: 5    ciclopirox (PENLAC) 8 % solution, Apply topically nightly. Remove with acetone every 7 days. , Disp: 6 mL, Rfl: 5    Melatonin 5 MG CHEW, Take 5 mg by mouth nightly, Disp: , Rfl:     aspirin 81 MG EC tablet, Take 81 mg by mouth daily, Disp: , Rfl:     diphenhydrAMINE (ALLERGY RELIEF) 25 MG capsule, Take 25 mg by mouth nightly, Disp: , Rfl:     Exam: There are still some palpatory tenderness over the right SI joint today. Mild midline discomfort L4-5    There are hypertonic and tender fibers noted today in the lateral lumbar paraspinal muscles. Joint fixation is noted with motion screening at bilateral SI joints and L3-4. Hemalatha was seen today for back pain. Diagnoses and all orders for this visit:    Segmental and somatic dysfunction of pelvic region    Segmental and somatic dysfunction of lumbar region    Sprain of sacroiliac region, initial encounter    Chronic right-sided low back pain with right-sided sciatica        Treatment Plan: Rodriguez flexion distraction manipulation today at L3, protocol 1. Side-lying diversified manipulation to the SI joints. Tolerated well.   She will follow-up as scheduled for care      Seen By:  Belgica Concepcion

## 2020-06-29 LAB
HPV SAMPLE: NORMAL
HPV TYPE 16: NOT DETECTED
HPV TYPE 18: NOT DETECTED
HPV, HIGH RISK OTHER: NOT DETECTED
INTERPRETATION: NORMAL
SOURCE: NORMAL

## 2020-07-14 RX ORDER — LIDOCAINE HCL/PALM OIL
SPRAY, NON-AEROSOL (ML) TOPICAL
Qty: 60 ML | Refills: 2 | Status: SHIPPED
Start: 2020-07-14 | End: 2021-03-23

## 2020-07-27 ENCOUNTER — TELEPHONE (OUTPATIENT)
Dept: PHYSICAL MEDICINE AND REHAB | Age: 55
End: 2020-07-27

## 2020-07-27 NOTE — TELEPHONE ENCOUNTER
OK. Schedule for 40 minute appointment for her injection. We have 8/14 available due to no EMGs that day if that works for the patient.   TERRY

## 2020-07-28 NOTE — TELEPHONE ENCOUNTER
Valdemar Perez was unable to come in on 8-14-20 she is scheduled for 8-26-20.   Electronically signed by Charly Graves MA on 7/28/2020 at 1:28 PM

## 2020-08-31 RX ORDER — ARIPIPRAZOLE 5 MG/1
TABLET ORAL
Qty: 30 TABLET | Refills: 0 | Status: SHIPPED
Start: 2020-08-31 | End: 2020-09-22 | Stop reason: SDUPTHER

## 2020-09-02 RX ORDER — ATORVASTATIN CALCIUM 10 MG/1
TABLET, FILM COATED ORAL
Qty: 90 TABLET | Refills: 3 | Status: SHIPPED
Start: 2020-09-02 | End: 2021-03-23

## 2020-09-02 RX ORDER — AMLODIPINE BESYLATE 5 MG/1
TABLET ORAL
Qty: 90 TABLET | Refills: 3 | Status: SHIPPED
Start: 2020-09-02 | End: 2020-09-22 | Stop reason: ALTCHOICE

## 2020-09-09 RX ORDER — LOSARTAN POTASSIUM 50 MG/1
50 TABLET ORAL DAILY
Qty: 30 TABLET | Refills: 5 | Status: SHIPPED
Start: 2020-09-09 | End: 2021-03-23 | Stop reason: SDUPTHER

## 2020-09-09 RX ORDER — LEVOTHYROXINE SODIUM 0.1 MG/1
100 TABLET ORAL DAILY
Qty: 30 TABLET | Refills: 5 | Status: SHIPPED
Start: 2020-09-09 | End: 2021-03-23 | Stop reason: SDUPTHER

## 2020-09-09 RX ORDER — OMEPRAZOLE 40 MG/1
40 CAPSULE, DELAYED RELEASE ORAL DAILY
Qty: 30 CAPSULE | Refills: 5 | Status: SHIPPED
Start: 2020-09-09 | End: 2021-03-25

## 2020-09-21 ENCOUNTER — HOSPITAL ENCOUNTER (OUTPATIENT)
Age: 55
Discharge: HOME | End: 2020-09-23
Payer: COMMERCIAL

## 2020-09-21 ENCOUNTER — TELEPHONE (OUTPATIENT)
Dept: PRIMARY CARE CLINIC | Age: 55
End: 2020-09-21

## 2020-09-21 LAB
ALBUMIN SERPL-MCNC: 4.6 G/DL (ref 3.5–5.2)
ALP BLD-CCNC: 50 U/L (ref 35–104)
ALT SERPL-CCNC: 15 U/L (ref 0–32)
ANION GAP SERPL CALCULATED.3IONS-SCNC: 16 MMOL/L (ref 7–16)
AST SERPL-CCNC: 25 U/L (ref 0–31)
BASOPHILS ABSOLUTE: 0.06 E9/L (ref 0–0.2)
BASOPHILS RELATIVE PERCENT: 0.9 % (ref 0–2)
BILIRUB SERPL-MCNC: 0.4 MG/DL (ref 0–1.2)
BUN BLDV-MCNC: 24 MG/DL (ref 6–20)
CALCIUM SERPL-MCNC: 9.9 MG/DL (ref 8.6–10.2)
CHLORIDE BLD-SCNC: 106 MMOL/L (ref 98–107)
CHOLESTEROL, TOTAL: 151 MG/DL (ref 0–199)
CO2: 22 MMOL/L (ref 22–29)
CREAT SERPL-MCNC: 1 MG/DL (ref 0.5–1)
EOSINOPHILS ABSOLUTE: 0.23 E9/L (ref 0.05–0.5)
EOSINOPHILS RELATIVE PERCENT: 3.5 % (ref 0–6)
GFR AFRICAN AMERICAN: >60
GFR NON-AFRICAN AMERICAN: 58 ML/MIN/1.73
GLUCOSE BLD-MCNC: 138 MG/DL (ref 74–99)
HBA1C MFR BLD: 6 % (ref 4–5.6)
HCT VFR BLD CALC: 38.3 % (ref 34–48)
HDLC SERPL-MCNC: 42 MG/DL
HEMOGLOBIN: 12.5 G/DL (ref 11.5–15.5)
IMMATURE GRANULOCYTES #: 0.03 E9/L
IMMATURE GRANULOCYTES %: 0.5 % (ref 0–5)
LDL CHOLESTEROL CALCULATED: 85 MG/DL (ref 0–99)
LYMPHOCYTES ABSOLUTE: 1.52 E9/L (ref 1.5–4)
LYMPHOCYTES RELATIVE PERCENT: 23.3 % (ref 20–42)
MCH RBC QN AUTO: 29 PG (ref 26–35)
MCHC RBC AUTO-ENTMCNC: 32.6 % (ref 32–34.5)
MCV RBC AUTO: 88.9 FL (ref 80–99.9)
MONOCYTES ABSOLUTE: 0.34 E9/L (ref 0.1–0.95)
MONOCYTES RELATIVE PERCENT: 5.2 % (ref 2–12)
NEUTROPHILS ABSOLUTE: 4.35 E9/L (ref 1.8–7.3)
NEUTROPHILS RELATIVE PERCENT: 66.6 % (ref 43–80)
PDW BLD-RTO: 12.6 FL (ref 11.5–15)
PLATELET # BLD: 124 E9/L (ref 130–450)
PMV BLD AUTO: 13.4 FL (ref 7–12)
POTASSIUM SERPL-SCNC: 4.6 MMOL/L (ref 3.5–5)
RBC # BLD: 4.31 E12/L (ref 3.5–5.5)
SODIUM BLD-SCNC: 144 MMOL/L (ref 132–146)
T4 FREE: 1.54 NG/DL (ref 0.93–1.7)
TOTAL PROTEIN: 6.6 G/DL (ref 6.4–8.3)
TRIGL SERPL-MCNC: 120 MG/DL (ref 0–149)
TSH SERPL DL<=0.05 MIU/L-ACNC: 1.47 UIU/ML (ref 0.27–4.2)
VITAMIN D 25-HYDROXY: 28 NG/ML (ref 30–100)
VLDLC SERPL CALC-MCNC: 24 MG/DL
WBC # BLD: 6.5 E9/L (ref 4.5–11.5)

## 2020-09-21 PROCEDURE — 84443 ASSAY THYROID STIM HORMONE: CPT

## 2020-09-21 PROCEDURE — 83036 HEMOGLOBIN GLYCOSYLATED A1C: CPT

## 2020-09-21 PROCEDURE — 80061 LIPID PANEL: CPT

## 2020-09-21 PROCEDURE — 36415 COLL VENOUS BLD VENIPUNCTURE: CPT

## 2020-09-21 PROCEDURE — 80053 COMPREHEN METABOLIC PANEL: CPT

## 2020-09-21 PROCEDURE — 84439 ASSAY OF FREE THYROXINE: CPT

## 2020-09-21 PROCEDURE — 82306 VITAMIN D 25 HYDROXY: CPT

## 2020-09-21 PROCEDURE — 85025 COMPLETE CBC W/AUTO DIFF WBC: CPT

## 2020-09-21 RX ORDER — FLUTICASONE PROPIONATE 50 MCG
SPRAY, SUSPENSION (ML) NASAL
COMMUNITY
Start: 2020-08-05 | End: 2021-03-23 | Stop reason: SDUPTHER

## 2020-09-21 RX ORDER — BACLOFEN 10 MG/1
TABLET ORAL
COMMUNITY
Start: 2020-08-05 | End: 2020-09-22 | Stop reason: ALTCHOICE

## 2020-09-21 ASSESSMENT — ENCOUNTER SYMPTOMS
ABDOMINAL PAIN: 0
VOMITING: 0
CONSTIPATION: 0
SHORTNESS OF BREATH: 0
NAUSEA: 0
DIARRHEA: 0
WHEEZING: 0
COUGH: 0

## 2020-09-21 NOTE — TELEPHONE ENCOUNTER
Pt is upstairs @ lab and is needing orders added to her chart. She has f/u appt with you tomorrow. ..thanks!

## 2020-09-22 ENCOUNTER — OFFICE VISIT (OUTPATIENT)
Dept: PRIMARY CARE CLINIC | Age: 55
End: 2020-09-22
Payer: COMMERCIAL

## 2020-09-22 VITALS
WEIGHT: 169 LBS | HEART RATE: 64 BPM | DIASTOLIC BLOOD PRESSURE: 62 MMHG | BODY MASS INDEX: 28.85 KG/M2 | TEMPERATURE: 96.2 F | SYSTOLIC BLOOD PRESSURE: 126 MMHG | HEIGHT: 64 IN | OXYGEN SATURATION: 95 %

## 2020-09-22 PROCEDURE — 99214 OFFICE O/P EST MOD 30 MIN: CPT | Performed by: FAMILY MEDICINE

## 2020-09-22 PROCEDURE — G8427 DOCREV CUR MEDS BY ELIG CLIN: HCPCS | Performed by: FAMILY MEDICINE

## 2020-09-22 PROCEDURE — 3044F HG A1C LEVEL LT 7.0%: CPT | Performed by: FAMILY MEDICINE

## 2020-09-22 PROCEDURE — 1036F TOBACCO NON-USER: CPT | Performed by: FAMILY MEDICINE

## 2020-09-22 PROCEDURE — G8417 CALC BMI ABV UP PARAM F/U: HCPCS | Performed by: FAMILY MEDICINE

## 2020-09-22 PROCEDURE — 3017F COLORECTAL CA SCREEN DOC REV: CPT | Performed by: FAMILY MEDICINE

## 2020-09-22 PROCEDURE — 2022F DILAT RTA XM EVC RTNOPTHY: CPT | Performed by: FAMILY MEDICINE

## 2020-09-22 RX ORDER — SERTRALINE HYDROCHLORIDE 100 MG/1
100 TABLET, FILM COATED ORAL DAILY
Qty: 90 TABLET | Refills: 1 | Status: SHIPPED
Start: 2020-09-22 | End: 2021-03-23 | Stop reason: SDUPTHER

## 2020-09-22 RX ORDER — OXYBUTYNIN CHLORIDE 5 MG/1
5 TABLET, EXTENDED RELEASE ORAL EVERY EVENING
Qty: 90 TABLET | Refills: 1 | Status: SHIPPED
Start: 2020-09-22 | End: 2021-03-23 | Stop reason: SDUPTHER

## 2020-09-22 RX ORDER — SEMAGLUTIDE 1.34 MG/ML
1 INJECTION, SOLUTION SUBCUTANEOUS WEEKLY
Qty: 6 PEN | Refills: 1 | Status: SHIPPED
Start: 2020-09-22 | End: 2020-11-16 | Stop reason: SDUPTHER

## 2020-09-22 RX ORDER — MULTIVIT-MIN/IRON/FOLIC ACID/K 18-600-40
4000 CAPSULE ORAL DAILY
Qty: 180 CAPSULE | Refills: 1 | Status: SHIPPED
Start: 2020-09-22 | End: 2021-03-23

## 2020-09-22 RX ORDER — INSULIN DEGLUDEC 200 U/ML
INJECTION, SOLUTION SUBCUTANEOUS
Qty: 3 PEN | Refills: 5 | Status: SHIPPED
Start: 2020-09-22 | End: 2021-08-18 | Stop reason: SDUPTHER

## 2020-09-22 RX ORDER — ARIPIPRAZOLE 5 MG/1
TABLET ORAL
Qty: 90 TABLET | Refills: 1 | Status: SHIPPED
Start: 2020-09-22 | End: 2021-03-23 | Stop reason: SDUPTHER

## 2020-09-22 RX ORDER — FENOFIBRATE 160 MG/1
160 TABLET ORAL DAILY
Qty: 30 TABLET | Refills: 5 | Status: CANCELLED | OUTPATIENT
Start: 2020-09-22

## 2020-09-22 ASSESSMENT — ENCOUNTER SYMPTOMS: BACK PAIN: 0

## 2020-09-22 NOTE — PROGRESS NOTES
Patient had 90% of thyroid removed. ROS:  Review of Systems   Constitutional: Positive for fatigue and unexpected weight change. Negative for chills and fever. Respiratory: Negative for cough, shortness of breath and wheezing. Cardiovascular: Negative for chest pain and palpitations. Gastrointestinal: Negative for abdominal pain, constipation, diarrhea, nausea and vomiting. Genitourinary: Negative for difficulty urinating, dysuria, menstrual problem, pelvic pain, vaginal bleeding, vaginal discharge and vaginal pain. Musculoskeletal: Negative for arthralgias and back pain. Skin: Negative for rash. Neurological: Negative for dizziness and headaches. Psychiatric/Behavioral: Negative for dysphoric mood. The patient is not nervous/anxious. All other systems reviewed and are negative.          Current Outpatient Medications:     Cholecalciferol (VITAMIN D) 50 MCG (2000 UT) CAPS capsule, Take 4,000 Units by mouth daily, Disp: 180 capsule, Rfl: 1    ARIPiprazole (ABILIFY) 5 MG tablet, Take 1 tablet by mouth once daily, Disp: 90 tablet, Rfl: 1    oxybutynin (DITROPAN XL) 5 MG extended release tablet, Take 1 tablet by mouth every evening, Disp: 90 tablet, Rfl: 1    sertraline (ZOLOFT) 100 MG tablet, Take 1 tablet by mouth daily, Disp: 90 tablet, Rfl: 1    Insulin Degludec (TRESIBA FLEXTOUCH) 200 UNIT/ML SOPN, INJECT 30 UNITS SUBCUTANEOUSLY NIGHTLY, Disp: 3 pen, Rfl: 5    Semaglutide, 1 MG/DOSE, (OZEMPIC, 1 MG/DOSE,) 2 MG/1.5ML SOPN, Inject 1 mg into the skin once a week, Disp: 6 pen, Rfl: 1    fluticasone (FLONASE) 50 MCG/ACT nasal spray, USE 1 SPRAY(S) IN EACH NOSTRIL TWICE DAILY, Disp: , Rfl:     diclofenac sodium (VOLTAREN) 1 % GEL, Apply 4 g topically 4 times daily, Disp: 4 Tube, Rfl: 1    levothyroxine (SYNTHROID) 100 MCG tablet, Take 1 tablet by mouth Daily, Disp: 30 tablet, Rfl: 5    losartan (COZAAR) 50 MG tablet, Take 1 tablet by mouth daily, Disp: 30 tablet, Rfl: 5    omeprazole (PRILOSEC) 40 MG delayed release capsule, Take 1 capsule by mouth daily, Disp: 30 capsule, Rfl: 5    atorvastatin (LIPITOR) 10 MG tablet, Take 1 tablet by mouth once daily, Disp: 90 tablet, Rfl: 3    Dermatological Products, Misc. (KAMDOY) EMUL, SHAKE CANISTER WELL BEFORE USING,HOLD 3 TO 5 INCHES FROM PAIN AREA AND SPRAY AREA UNTIL WET FOUR TIMES A DAY. AVOID FACE , Disp: 60 mL, Rfl: 2    metoclopramide (REGLAN) 5 MG tablet, Take 1 tablet by mouth 3 times daily, Disp: 120 tablet, Rfl: 3    Insulin Pen Needle (PEN NEEDLES) 31G X 5 MM MISC, Inject 1 Dose into the skin daily, Disp: 100 each, Rfl: 3    albuterol sulfate HFA (PROAIR HFA) 108 (90 Base) MCG/ACT inhaler, Inhale 2 puffs into the lungs every 4 hours as needed for Wheezing, Disp: 1 Inhaler, Rfl: 3    fluticasone-salmeterol (ADVAIR DISKUS) 250-50 MCG/DOSE AEPB, Inhale 1 puff into the lungs every 12 hours, Disp: 3 Inhaler, Rfl: 2    cyclobenzaprine (FLEXERIL) 10 MG tablet, TAKE 1 TABLET BY MOUTH ONCE DAILY AS NEEDED FOR MUSCLE SPASMS, Disp: , Rfl:     diclofenac (SOLARAZE) 3 % gel, , Disp: , Rfl:     doxepin (ZONALON) 5 % cream, , Disp: , Rfl:     Tens Unit MISC, by Does not apply route, Disp: 1 each, Rfl: 0    Nerve Stimulator (TENS THERAPY REPLACE BODY PADS) MISC, Use as directed PRN, Disp: 3 each, Rfl: 5    ciclopirox (PENLAC) 8 % solution, Apply topically nightly. Remove with acetone every 7 days. , Disp: 6 mL, Rfl: 5    Melatonin 5 MG CHEW, Take 5 mg by mouth nightly, Disp: , Rfl:     aspirin 81 MG EC tablet, Take 81 mg by mouth daily, Disp: , Rfl:     diphenhydrAMINE (ALLERGY RELIEF) 25 MG capsule, Take 25 mg by mouth nightly, Disp: , Rfl:     No Known Allergies    Past Medical History:   Diagnosis Date    Bilateral hand pain 2/21/2020    Depression     Hyperlipidemia     Hypertension     Hypothyroidism     Type 2 diabetes mellitus without complication (HCC)     Urinary incontinence      OB History    No obstetric history on file. Temp: 96.2 °F (35.7 °C)   SpO2: 95%   Weight: 169 lb (76.7 kg)   Height: 5' 3.5\" (1.613 m)       Physical Exam:  Physical Exam  Vitals signs and nursing note reviewed. Constitutional:       General: She is not in acute distress. Appearance: Normal appearance. She is well-developed. She is obese. She is not ill-appearing. HENT:      Head: Normocephalic and atraumatic. Right Ear: External ear normal.      Left Ear: External ear normal.      Nose:      Comments: Patient wearing mask     Mouth/Throat:      Lips: No lesions. Eyes:      General: No scleral icterus. Extraocular Movements: Extraocular movements intact. Conjunctiva/sclera: Conjunctivae normal.   Neck:      Musculoskeletal: Normal range of motion and neck supple. Thyroid: No thyromegaly. Cardiovascular:      Rate and Rhythm: Normal rate and regular rhythm. Heart sounds: Normal heart sounds. No murmur. Pulmonary:      Effort: Pulmonary effort is normal. No respiratory distress. Breath sounds: Normal breath sounds. No wheezing. Genitourinary:     Exam position: Supine. Labia:         Right: No rash or lesion. Left: No rash or lesion. Vagina: No vaginal discharge, erythema, tenderness or bleeding. Cervix: No cervical motion tenderness, discharge or friability. Uterus: Not enlarged and not tender. Adnexa:         Right: No mass, tenderness or fullness. Left: No mass, tenderness or fullness. Musculoskeletal: Normal range of motion. General: No tenderness or deformity. Right lower leg: No edema. Left lower leg: No edema. Lymphadenopathy:      Cervical: No cervical adenopathy. Skin:     General: Skin is warm and dry. Findings: No rash. Neurological:      General: No focal deficit present. Mental Status: She is alert and oriented to person, place, and time.       Gait: Gait normal.   Psychiatric:         Mood and Affect: Mood normal. Behavior: Behavior normal.         Thought Content: Thought content normal.         Labs:  CBC with Differential:    Lab Results   Component Value Date    WBC 6.5 09/21/2020    RBC 4.31 09/21/2020    HGB 12.5 09/21/2020    HCT 38.3 09/21/2020     09/21/2020    MCV 88.9 09/21/2020    MCH 29.0 09/21/2020    MCHC 32.6 09/21/2020    RDW 12.6 09/21/2020    LYMPHOPCT 23.3 09/21/2020    MONOPCT 5.2 09/21/2020    BASOPCT 0.9 09/21/2020    MONOSABS 0.34 09/21/2020    LYMPHSABS 1.52 09/21/2020    EOSABS 0.23 09/21/2020    BASOSABS 0.06 09/21/2020     CMP:    Lab Results   Component Value Date     09/21/2020    K 4.6 09/21/2020     09/21/2020    CO2 22 09/21/2020    BUN 24 09/21/2020    CREATININE 1.0 09/21/2020    GFRAA >60 09/21/2020    LABGLOM 58 09/21/2020    GLUCOSE 138 09/21/2020    PROT 6.6 09/21/2020    LABALBU 4.6 09/21/2020    CALCIUM 9.9 09/21/2020    BILITOT 0.4 09/21/2020    ALKPHOS 50 09/21/2020    AST 25 09/21/2020    ALT 15 09/21/2020     HgBA1c:    Lab Results   Component Value Date    LABA1C 6.0 09/21/2020     Microalbumen/Creatinine ratio:  No components found for: RUCREAT  FLP:    Lab Results   Component Value Date    TRIG 120 09/21/2020    HDL 42 09/21/2020    LDLCALC 85 09/21/2020    LABVLDL 24 09/21/2020     TSH:    Lab Results   Component Value Date    TSH 1.470 09/21/2020        Assessment and Plan:  Johanne Braun was seen today for diabetes. Diagnoses and all orders for this visit:    Type 2 diabetes mellitus with complication, without long-term current use of insulin (HCC)  -     Insulin Degludec (TRESIBA FLEXTOUCH) 200 UNIT/ML SOPN; INJECT 30 UNITS SUBCUTANEOUSLY NIGHTLY  -     Semaglutide, 1 MG/DOSE, (OZEMPIC, 1 MG/DOSE,) 2 MG/1.5ML SOPN; Inject 1 mg into the skin once a week  -     CBC Auto Differential; Future  -     Comprehensive Metabolic Panel; Future  -     Hemoglobin A1C; Future  -     Microalbumin / Creatinine Urine Ratio;  Future  Significantly improved on current medications. Recheck in 6 months. Essential hypertension  Will stop Amlodipine as BP has been so well controlled. Will see if Losartan alone can control BP    Mixed hyperlipidemia  -     Lipid Panel; Future  Will stop Fenofibrate    Acquired hypothyroidism  -     TSH without Reflex; Future  Well controlled    Mild episode of recurrent major depressive disorder (HCC)  -     ARIPiprazole (ABILIFY) 5 MG tablet; Take 1 tablet by mouth once daily  -     sertraline (ZOLOFT) 100 MG tablet; Take 1 tablet by mouth daily    Stress incontinence  -     oxybutynin (DITROPAN XL) 5 MG extended release tablet; Take 1 tablet by mouth every evening    Vitamin D deficiency  -     Cholecalciferol (VITAMIN D) 50 MCG (2000 UT) CAPS capsule; Take 4,000 Units by mouth daily  -     Vitamin D 25 Hydroxy; Future        Return in about 6 months (around 3/22/2021) for Recheck.       Seen By:  Chely Cervantes DO

## 2020-11-16 RX ORDER — SEMAGLUTIDE 1.34 MG/ML
1 INJECTION, SOLUTION SUBCUTANEOUS WEEKLY
Qty: 6 PEN | Refills: 1 | Status: SHIPPED
Start: 2020-11-16 | End: 2020-12-23 | Stop reason: SDUPTHER

## 2020-11-17 RX ORDER — METOCLOPRAMIDE 5 MG/1
5 TABLET ORAL 3 TIMES DAILY
Qty: 270 TABLET | Refills: 1 | Status: SHIPPED
Start: 2020-11-17 | End: 2021-03-23

## 2020-11-18 RX ORDER — SEMAGLUTIDE 1.34 MG/ML
1 INJECTION, SOLUTION SUBCUTANEOUS WEEKLY
Qty: 6 PEN | Refills: 1 | OUTPATIENT
Start: 2020-11-18 | End: 2021-02-16

## 2020-11-18 NOTE — TELEPHONE ENCOUNTER
I just refilled this 2 days ago. Patient needs to call the pharmacy.   Approval from [place] came through fax today

## 2020-12-23 RX ORDER — SEMAGLUTIDE 1.34 MG/ML
1 INJECTION, SOLUTION SUBCUTANEOUS WEEKLY
Qty: 6 PEN | Refills: 1 | Status: SHIPPED
Start: 2020-12-23 | End: 2021-04-12

## 2021-03-22 DIAGNOSIS — E03.9 ACQUIRED HYPOTHYROIDISM: ICD-10-CM

## 2021-03-22 DIAGNOSIS — E11.8 TYPE 2 DIABETES MELLITUS WITH COMPLICATION, WITHOUT LONG-TERM CURRENT USE OF INSULIN (HCC): ICD-10-CM

## 2021-03-22 DIAGNOSIS — E55.9 VITAMIN D DEFICIENCY: ICD-10-CM

## 2021-03-22 DIAGNOSIS — E78.2 MIXED HYPERLIPIDEMIA: ICD-10-CM

## 2021-03-22 LAB
ALBUMIN SERPL-MCNC: 4.5 G/DL (ref 3.5–5.2)
ALP BLD-CCNC: 70 U/L (ref 35–104)
ALT SERPL-CCNC: 16 U/L (ref 0–32)
ANION GAP SERPL CALCULATED.3IONS-SCNC: 12 MMOL/L (ref 7–16)
AST SERPL-CCNC: 22 U/L (ref 0–31)
BASOPHILS ABSOLUTE: 0.03 E9/L (ref 0–0.2)
BASOPHILS RELATIVE PERCENT: 0.4 % (ref 0–2)
BILIRUB SERPL-MCNC: 0.5 MG/DL (ref 0–1.2)
BUN BLDV-MCNC: 17 MG/DL (ref 6–20)
CALCIUM SERPL-MCNC: 9.4 MG/DL (ref 8.6–10.2)
CHLORIDE BLD-SCNC: 102 MMOL/L (ref 98–107)
CHOLESTEROL, TOTAL: 135 MG/DL (ref 0–199)
CO2: 25 MMOL/L (ref 22–29)
CREAT SERPL-MCNC: 1 MG/DL (ref 0.5–1)
CREATININE URINE: 154 MG/DL (ref 29–226)
EOSINOPHILS ABSOLUTE: 0.18 E9/L (ref 0.05–0.5)
EOSINOPHILS RELATIVE PERCENT: 2.7 % (ref 0–6)
GFR AFRICAN AMERICAN: >60
GFR NON-AFRICAN AMERICAN: 57 ML/MIN/1.73
GLUCOSE BLD-MCNC: 105 MG/DL (ref 74–99)
HBA1C MFR BLD: 5.4 % (ref 4–5.6)
HCT VFR BLD CALC: 41 % (ref 34–48)
HDLC SERPL-MCNC: 45 MG/DL
HEMOGLOBIN: 12.9 G/DL (ref 11.5–15.5)
IMMATURE GRANULOCYTES #: 0.03 E9/L
IMMATURE GRANULOCYTES %: 0.4 % (ref 0–5)
LDL CHOLESTEROL CALCULATED: 72 MG/DL (ref 0–99)
LYMPHOCYTES ABSOLUTE: 1.79 E9/L (ref 1.5–4)
LYMPHOCYTES RELATIVE PERCENT: 26.7 % (ref 20–42)
MCH RBC QN AUTO: 28.2 PG (ref 26–35)
MCHC RBC AUTO-ENTMCNC: 31.5 % (ref 32–34.5)
MCV RBC AUTO: 89.5 FL (ref 80–99.9)
MICROALBUMIN UR-MCNC: 15.1 MG/L
MICROALBUMIN/CREAT UR-RTO: 9.8 (ref 0–30)
MONOCYTES ABSOLUTE: 0.43 E9/L (ref 0.1–0.95)
MONOCYTES RELATIVE PERCENT: 6.4 % (ref 2–12)
NEUTROPHILS ABSOLUTE: 4.24 E9/L (ref 1.8–7.3)
NEUTROPHILS RELATIVE PERCENT: 63.4 % (ref 43–80)
PDW BLD-RTO: 13.2 FL (ref 11.5–15)
PLATELET # BLD: 139 E9/L (ref 130–450)
PMV BLD AUTO: 12.4 FL (ref 7–12)
POTASSIUM SERPL-SCNC: 4.5 MMOL/L (ref 3.5–5)
RBC # BLD: 4.58 E12/L (ref 3.5–5.5)
SODIUM BLD-SCNC: 139 MMOL/L (ref 132–146)
TOTAL PROTEIN: 6.6 G/DL (ref 6.4–8.3)
TRIGL SERPL-MCNC: 90 MG/DL (ref 0–149)
TSH SERPL DL<=0.05 MIU/L-ACNC: 0.75 UIU/ML (ref 0.27–4.2)
VITAMIN D 25-HYDROXY: 45 NG/ML (ref 30–100)
VLDLC SERPL CALC-MCNC: 18 MG/DL
WBC # BLD: 6.7 E9/L (ref 4.5–11.5)

## 2021-03-23 ENCOUNTER — OFFICE VISIT (OUTPATIENT)
Dept: PRIMARY CARE CLINIC | Age: 56
End: 2021-03-23
Payer: COMMERCIAL

## 2021-03-23 VITALS
SYSTOLIC BLOOD PRESSURE: 114 MMHG | HEART RATE: 64 BPM | BODY MASS INDEX: 25.27 KG/M2 | HEIGHT: 64 IN | OXYGEN SATURATION: 95 % | TEMPERATURE: 97.4 F | DIASTOLIC BLOOD PRESSURE: 64 MMHG | WEIGHT: 148 LBS

## 2021-03-23 DIAGNOSIS — F33.0 MILD EPISODE OF RECURRENT MAJOR DEPRESSIVE DISORDER (HCC): ICD-10-CM

## 2021-03-23 DIAGNOSIS — E11.8 TYPE 2 DIABETES MELLITUS WITH COMPLICATION, WITHOUT LONG-TERM CURRENT USE OF INSULIN (HCC): Primary | ICD-10-CM

## 2021-03-23 DIAGNOSIS — E78.2 MIXED HYPERLIPIDEMIA: ICD-10-CM

## 2021-03-23 DIAGNOSIS — N39.3 STRESS INCONTINENCE: ICD-10-CM

## 2021-03-23 DIAGNOSIS — E55.9 VITAMIN D DEFICIENCY: ICD-10-CM

## 2021-03-23 DIAGNOSIS — J45.20 MILD INTERMITTENT ASTHMA WITHOUT COMPLICATION: ICD-10-CM

## 2021-03-23 DIAGNOSIS — E03.9 ACQUIRED HYPOTHYROIDISM: ICD-10-CM

## 2021-03-23 DIAGNOSIS — I10 ESSENTIAL HYPERTENSION: ICD-10-CM

## 2021-03-23 PROCEDURE — G8484 FLU IMMUNIZE NO ADMIN: HCPCS | Performed by: FAMILY MEDICINE

## 2021-03-23 PROCEDURE — 2022F DILAT RTA XM EVC RTNOPTHY: CPT | Performed by: FAMILY MEDICINE

## 2021-03-23 PROCEDURE — G8427 DOCREV CUR MEDS BY ELIG CLIN: HCPCS | Performed by: FAMILY MEDICINE

## 2021-03-23 PROCEDURE — 3017F COLORECTAL CA SCREEN DOC REV: CPT | Performed by: FAMILY MEDICINE

## 2021-03-23 PROCEDURE — 1036F TOBACCO NON-USER: CPT | Performed by: FAMILY MEDICINE

## 2021-03-23 PROCEDURE — G8417 CALC BMI ABV UP PARAM F/U: HCPCS | Performed by: FAMILY MEDICINE

## 2021-03-23 PROCEDURE — 3044F HG A1C LEVEL LT 7.0%: CPT | Performed by: FAMILY MEDICINE

## 2021-03-23 PROCEDURE — 99214 OFFICE O/P EST MOD 30 MIN: CPT | Performed by: FAMILY MEDICINE

## 2021-03-23 RX ORDER — FLUTICASONE PROPIONATE 50 MCG
SPRAY, SUSPENSION (ML) NASAL
Qty: 1 BOTTLE | Refills: 3 | Status: SHIPPED
Start: 2021-03-23 | End: 2021-08-18 | Stop reason: ALTCHOICE

## 2021-03-23 RX ORDER — SERTRALINE HYDROCHLORIDE 100 MG/1
100 TABLET, FILM COATED ORAL DAILY
Qty: 90 TABLET | Refills: 1 | Status: SHIPPED
Start: 2021-03-23 | End: 2021-08-18 | Stop reason: SDUPTHER

## 2021-03-23 RX ORDER — MULTIVIT-MIN/IRON/FOLIC ACID/K 18-600-40
CAPSULE ORAL
COMMUNITY
End: 2021-08-18 | Stop reason: ALTCHOICE

## 2021-03-23 RX ORDER — MULTIVIT-MIN/IRON/FOLIC ACID/K 18-600-40
4000 CAPSULE ORAL DAILY
Qty: 180 CAPSULE | Refills: 1 | Status: SHIPPED
Start: 2021-03-23 | End: 2021-08-18 | Stop reason: ALTCHOICE

## 2021-03-23 RX ORDER — ATORVASTATIN CALCIUM 10 MG/1
TABLET, FILM COATED ORAL
Qty: 90 TABLET | Refills: 1 | Status: SHIPPED
Start: 2021-03-23 | End: 2021-08-18 | Stop reason: ALTCHOICE

## 2021-03-23 RX ORDER — OXYBUTYNIN CHLORIDE 5 MG/1
5 TABLET, EXTENDED RELEASE ORAL EVERY EVENING
Qty: 90 TABLET | Refills: 1 | Status: SHIPPED
Start: 2021-03-23 | End: 2021-08-18 | Stop reason: SDUPTHER

## 2021-03-23 RX ORDER — ATORVASTATIN CALCIUM 10 MG/1
10 TABLET, FILM COATED ORAL DAILY
COMMUNITY
End: 2021-08-18 | Stop reason: SDUPTHER

## 2021-03-23 RX ORDER — ARIPIPRAZOLE 5 MG/1
TABLET ORAL
Qty: 90 TABLET | Refills: 1 | Status: SHIPPED
Start: 2021-03-23 | End: 2021-08-18 | Stop reason: SDUPTHER

## 2021-03-23 RX ORDER — LOSARTAN POTASSIUM 50 MG/1
50 TABLET ORAL DAILY
Qty: 90 TABLET | Refills: 1 | Status: SHIPPED
Start: 2021-03-23 | End: 2021-08-18 | Stop reason: SDUPTHER

## 2021-03-23 RX ORDER — METOCLOPRAMIDE 5 MG/1
5 TABLET ORAL 3 TIMES DAILY
COMMUNITY
End: 2021-08-18 | Stop reason: ALTCHOICE

## 2021-03-23 RX ORDER — LEVOTHYROXINE SODIUM 0.1 MG/1
100 TABLET ORAL DAILY
Qty: 90 TABLET | Refills: 1 | Status: SHIPPED
Start: 2021-03-23 | End: 2021-08-18 | Stop reason: SDUPTHER

## 2021-03-23 ASSESSMENT — ENCOUNTER SYMPTOMS
SHORTNESS OF BREATH: 0
BACK PAIN: 0
WHEEZING: 0
NAUSEA: 0
ABDOMINAL PAIN: 0
VOMITING: 0
COUGH: 0
CONSTIPATION: 0
DIARRHEA: 0

## 2021-03-23 NOTE — PROGRESS NOTES
3/23/21  Verna Lr : 1965 Sex: female  Age: 54 y.o. Chief Complaint   Patient presents with    Diabetes     HPI:  54 y.o. female presents today for 6 month follow up of chronic medical conditions, medication refills and review FBW. Patient's chart, medical, surgical and medication history all reviewed. Diabetes Mellitus  54 y.o. female presents for follow up of type 2 diabetes. Current diabetic medications include: insulin injections: Tresiba 30U QHS and Ozempic 1 mg. Previous medications tried include: oral agents (triple therapy): glipizide (Glucotrol), metformin (generic), Steglatro, but failed due to nausea, vomiting and abdominal pain (Metformin) and cost/coverage (Steglatro). Most recent HgA1c was 5.4% (2021)---6.0% (2020)---6.1% (2020)---6.9% (2020)---9.7% (2019)---10.5% (2019). Her most recent TSH was 0.754. LDL is 72. Renal function is reduced but improved (GFR= 57). The patient has evidence of end organ damage including nephropathy. She does not see a Podiatrist for foot care . Last eye exam was completed in - no retinopathy. Hypertension   The patient presents today for follow up of HTN. The problem is well controlled. Risk factors for coronary artery disease include Age > 27, HTN, diabetes and elevated cholesterol. Current treatments include losartan (Cozaar). The patient is compliant all of the time. Lifestyle changes the patient has made include dietary improvement(s) and weight management. Today the patient is complaining of none. Previously on Amlodipine, but BPs so well controlled that it was DC'd.     Hypothyroidism  Patient presents for routine follow up of Hypothyroidism. Current symptoms: change in energy level and weight changes. Patient denies diarrhea, heat / cold intolerance, nervousness and palpitations. Symptoms have been well-controlled. No difficulty swallowing or masses felt.   Last TSH was 0.754. Patient is currently taking levothyroxine 100 mcg. Patient had 90% of thyroid removed. Hyperlipidemia  The 10-year ASCVD risk score (Erwin Wynn, et al., 2013) is: 3.3%    Values used to calculate the score:      Age: 54 years      Sex: Female      Is Non- : No      Diabetic: Yes      Tobacco smoker: No      Systolic Blood Pressure: 066 mmHg      Is BP treated: Yes      HDL Cholesterol: 45 mg/dL      Total Cholesterol: 135 mg/dL      ROS:  Review of Systems   Constitutional: Negative for chills, fatigue, fever and unexpected weight change. Respiratory: Negative for cough, shortness of breath and wheezing. Cardiovascular: Negative for chest pain and palpitations. Gastrointestinal: Negative for abdominal pain, constipation, diarrhea, nausea and vomiting. Genitourinary: Negative for difficulty urinating, dysuria, menstrual problem, pelvic pain, vaginal bleeding, vaginal discharge and vaginal pain. Musculoskeletal: Negative for arthralgias and back pain. Skin: Negative for rash. Neurological: Negative for dizziness and headaches. Psychiatric/Behavioral: Negative for dysphoric mood. The patient is not nervous/anxious. All other systems reviewed and are negative.          Current Outpatient Medications:     atorvastatin (LIPITOR) 10 MG tablet, Take 10 mg by mouth daily, Disp: , Rfl:     Cholecalciferol (VITAMIN D) 50 MCG (2000 UT) CAPS capsule, Take by mouth, Disp: , Rfl:     metoclopramide (REGLAN) 5 MG tablet, Take 5 mg by mouth 3 times daily, Disp: , Rfl:     ARIPiprazole (ABILIFY) 5 MG tablet, Take 1 tablet by mouth once daily, Disp: 90 tablet, Rfl: 1    oxybutynin (DITROPAN XL) 5 MG extended release tablet, Take 1 tablet by mouth every evening, Disp: 90 tablet, Rfl: 1    Cholecalciferol (VITAMIN D) 50 MCG (2000 UT) CAPS capsule, Take 4,000 Units by mouth daily, Disp: 180 capsule, Rfl: 1    sertraline (ZOLOFT) 100 MG tablet, Take 1 tablet by mouth daily, Disp: 90 tablet, Rfl: 1    levothyroxine (SYNTHROID) 100 MCG tablet, Take 1 tablet by mouth Daily, Disp: 90 tablet, Rfl: 1    losartan (COZAAR) 50 MG tablet, Take 1 tablet by mouth daily, Disp: 90 tablet, Rfl: 1    atorvastatin (LIPITOR) 10 MG tablet, Take 1 tablet by mouth once daily, Disp: 90 tablet, Rfl: 1    fluticasone (FLONASE) 50 MCG/ACT nasal spray, USE 1 SPRAY(S) IN EACH NOSTRIL TWICE DAILY, Disp: 1 Bottle, Rfl: 3    fluticasone-salmeterol (ADVAIR DISKUS) 250-50 MCG/DOSE AEPB, Inhale 1 puff into the lungs every 12 hours, Disp: 3 Inhaler, Rfl: 2    Semaglutide, 1 MG/DOSE, (OZEMPIC, 1 MG/DOSE,) 2 MG/1.5ML SOPN, Inject 1 mg into the skin once a week, Disp: 6 pen, Rfl: 1    Insulin Degludec (TRESIBA FLEXTOUCH) 200 UNIT/ML SOPN, INJECT 30 UNITS SUBCUTANEOUSLY NIGHTLY, Disp: 3 pen, Rfl: 5    diclofenac sodium (VOLTAREN) 1 % GEL, Apply 4 g topically 4 times daily, Disp: 4 Tube, Rfl: 1    omeprazole (PRILOSEC) 40 MG delayed release capsule, Take 1 capsule by mouth daily, Disp: 30 capsule, Rfl: 5    Insulin Pen Needle (PEN NEEDLES) 31G X 5 MM MISC, Inject 1 Dose into the skin daily, Disp: 100 each, Rfl: 3    albuterol sulfate HFA (PROAIR HFA) 108 (90 Base) MCG/ACT inhaler, Inhale 2 puffs into the lungs every 4 hours as needed for Wheezing, Disp: 1 Inhaler, Rfl: 3    cyclobenzaprine (FLEXERIL) 10 MG tablet, TAKE 1 TABLET BY MOUTH ONCE DAILY AS NEEDED FOR MUSCLE SPASMS, Disp: , Rfl:     diclofenac (SOLARAZE) 3 % gel, , Disp: , Rfl:     Melatonin 5 MG CHEW, Take 5 mg by mouth nightly, Disp: , Rfl:     aspirin 81 MG EC tablet, Take 81 mg by mouth daily, Disp: , Rfl:     diphenhydrAMINE (ALLERGY RELIEF) 25 MG capsule, Take 25 mg by mouth nightly, Disp: , Rfl:     No Known Allergies    Past Medical History:   Diagnosis Date    Bilateral hand pain 2/21/2020    Depression     Hyperlipidemia     Hypertension     Hypothyroidism     Type 2 diabetes mellitus without complication (HCC)     Urinary Vitals:    03/23/21 0804   BP: 114/64   Pulse: 64   Temp: 97.4 °F (36.3 °C)   SpO2: 95%   Weight: 148 lb (67.1 kg)   Height: 5' 3.5\" (1.613 m)       Physical Exam:  Physical Exam  Vitals signs and nursing note reviewed. Constitutional:       General: She is not in acute distress. Appearance: Normal appearance. She is well-developed and normal weight. She is not ill-appearing. HENT:      Head: Normocephalic and atraumatic. Right Ear: External ear normal.      Left Ear: External ear normal.      Nose:      Comments: Patient wearing mask     Mouth/Throat:      Lips: No lesions. Eyes:      General: No scleral icterus. Extraocular Movements: Extraocular movements intact. Conjunctiva/sclera: Conjunctivae normal.   Neck:      Musculoskeletal: Normal range of motion and neck supple. Thyroid: No thyromegaly. Cardiovascular:      Rate and Rhythm: Normal rate and regular rhythm. Heart sounds: Normal heart sounds. No murmur. Pulmonary:      Effort: Pulmonary effort is normal. No respiratory distress. Breath sounds: Normal breath sounds. No wheezing. Genitourinary:     Exam position: Supine. Labia:         Right: No rash or lesion. Left: No rash or lesion. Vagina: No vaginal discharge, erythema, tenderness or bleeding. Cervix: No cervical motion tenderness, discharge or friability. Uterus: Not enlarged and not tender. Adnexa:         Right: No mass, tenderness or fullness. Left: No mass, tenderness or fullness. Musculoskeletal: Normal range of motion. General: No tenderness or deformity. Right lower leg: No edema. Left lower leg: No edema. Lymphadenopathy:      Cervical: No cervical adenopathy. Skin:     General: Skin is warm and dry. Findings: No rash. Neurological:      General: No focal deficit present. Mental Status: She is alert and oriented to person, place, and time.       Gait: Gait normal. tablet; Take 1 tablet by mouth daily  Significantly well controlled without Amlodipine. May need to reduce Losartan dose if continues to go lower. Mixed hyperlipidemia  -     atorvastatin (LIPITOR) 10 MG tablet; Take 1 tablet by mouth once daily  -     Lipid Panel; Future  Well controlled    Acquired hypothyroidism  -     levothyroxine (SYNTHROID) 100 MCG tablet; Take 1 tablet by mouth Daily  -     TSH without Reflex; Future  -     T4, Free; Future  Well controlled    Mild episode of recurrent major depressive disorder (HCC)  -     ARIPiprazole (ABILIFY) 5 MG tablet; Take 1 tablet by mouth once daily  -     sertraline (ZOLOFT) 100 MG tablet; Take 1 tablet by mouth daily    Stress incontinence  -     oxybutynin (DITROPAN XL) 5 MG extended release tablet; Take 1 tablet by mouth every evening  Stable    Mild intermittent asthma without complication  -     fluticasone (FLONASE) 50 MCG/ACT nasal spray; USE 1 SPRAY(S) IN EACH NOSTRIL TWICE DAILY  -     fluticasone-salmeterol (ADVAIR DISKUS) 250-50 MCG/DOSE AEPB; Inhale 1 puff into the lungs every 12 hours    Vitamin D deficiency  -     Cholecalciferol (VITAMIN D) 50 MCG (2000 UT) CAPS capsule; Take 4,000 Units by mouth daily  -     Vitamin D 25 Hydroxy; Future        Return in about 6 months (around 9/23/2021) for Recheck.       Seen By:  Kina Garcia DO

## 2021-06-24 DIAGNOSIS — E11.65 TYPE 2 DIABETES MELLITUS WITH HYPERGLYCEMIA, WITH LONG-TERM CURRENT USE OF INSULIN (HCC): ICD-10-CM

## 2021-06-24 DIAGNOSIS — Z79.4 TYPE 2 DIABETES MELLITUS WITH HYPERGLYCEMIA, WITH LONG-TERM CURRENT USE OF INSULIN (HCC): ICD-10-CM

## 2021-06-28 DIAGNOSIS — Z79.4 TYPE 2 DIABETES MELLITUS WITH HYPERGLYCEMIA, WITH LONG-TERM CURRENT USE OF INSULIN (HCC): ICD-10-CM

## 2021-06-28 DIAGNOSIS — E11.65 TYPE 2 DIABETES MELLITUS WITH HYPERGLYCEMIA, WITH LONG-TERM CURRENT USE OF INSULIN (HCC): ICD-10-CM

## 2021-06-28 RX ORDER — PEN NEEDLE, DIABETIC 31 GX3/16"
NEEDLE, DISPOSABLE MISCELLANEOUS
Qty: 100 EACH | Refills: 3 | Status: SHIPPED | OUTPATIENT
Start: 2021-06-28

## 2021-08-18 ENCOUNTER — TELEPHONE (OUTPATIENT)
Dept: PRIMARY CARE CLINIC | Age: 56
End: 2021-08-18

## 2021-08-18 DIAGNOSIS — N39.3 STRESS INCONTINENCE: ICD-10-CM

## 2021-08-18 DIAGNOSIS — E78.2 MIXED HYPERLIPIDEMIA: ICD-10-CM

## 2021-08-18 DIAGNOSIS — E11.8 TYPE 2 DIABETES MELLITUS WITH COMPLICATION, WITHOUT LONG-TERM CURRENT USE OF INSULIN (HCC): ICD-10-CM

## 2021-08-18 DIAGNOSIS — I10 ESSENTIAL HYPERTENSION: ICD-10-CM

## 2021-08-18 DIAGNOSIS — F33.0 MILD EPISODE OF RECURRENT MAJOR DEPRESSIVE DISORDER (HCC): ICD-10-CM

## 2021-08-18 DIAGNOSIS — E03.9 ACQUIRED HYPOTHYROIDISM: ICD-10-CM

## 2021-08-18 RX ORDER — SERTRALINE HYDROCHLORIDE 100 MG/1
100 TABLET, FILM COATED ORAL DAILY
Qty: 90 TABLET | Refills: 0 | Status: SHIPPED | OUTPATIENT
Start: 2021-08-18

## 2021-08-18 RX ORDER — CYCLOBENZAPRINE HCL 10 MG
10 TABLET ORAL NIGHTLY PRN
Qty: 90 TABLET | Refills: 0 | Status: SHIPPED | OUTPATIENT
Start: 2021-08-18

## 2021-08-18 RX ORDER — LEVOTHYROXINE SODIUM 0.1 MG/1
100 TABLET ORAL DAILY
Qty: 90 TABLET | Refills: 0 | Status: SHIPPED | OUTPATIENT
Start: 2021-08-18

## 2021-08-18 RX ORDER — ASPIRIN 81 MG/1
81 TABLET ORAL DAILY
Qty: 90 TABLET | Refills: 0 | Status: SHIPPED | OUTPATIENT
Start: 2021-08-18

## 2021-08-18 RX ORDER — INSULIN DEGLUDEC 200 U/ML
20 INJECTION, SOLUTION SUBCUTANEOUS EVERY EVENING
Qty: 3 PEN | Refills: 0 | Status: SHIPPED | OUTPATIENT
Start: 2021-08-18

## 2021-08-18 RX ORDER — ARIPIPRAZOLE 5 MG/1
TABLET ORAL
Qty: 90 TABLET | Refills: 0 | Status: SHIPPED
Start: 2021-08-18 | End: 2021-09-02 | Stop reason: SDUPTHER

## 2021-08-18 RX ORDER — LOSARTAN POTASSIUM 50 MG/1
50 TABLET ORAL DAILY
Qty: 90 TABLET | Refills: 0 | Status: SHIPPED | OUTPATIENT
Start: 2021-08-18

## 2021-08-18 RX ORDER — OXYBUTYNIN CHLORIDE 5 MG/1
5 TABLET, EXTENDED RELEASE ORAL EVERY EVENING
Qty: 90 TABLET | Refills: 0 | Status: SHIPPED
Start: 2021-08-18 | End: 2021-09-02 | Stop reason: SDUPTHER

## 2021-08-18 RX ORDER — ATORVASTATIN CALCIUM 10 MG/1
10 TABLET, FILM COATED ORAL DAILY
Qty: 90 TABLET | Refills: 0 | Status: SHIPPED
Start: 2021-08-18 | End: 2021-09-02 | Stop reason: SDUPTHER

## 2021-08-18 RX ORDER — SEMAGLUTIDE 1.34 MG/ML
1 INJECTION, SOLUTION SUBCUTANEOUS WEEKLY
Qty: 6 PEN | Refills: 0 | Status: SHIPPED | OUTPATIENT
Start: 2021-08-18 | End: 2021-11-16

## 2021-09-02 DIAGNOSIS — E78.2 MIXED HYPERLIPIDEMIA: ICD-10-CM

## 2021-09-02 DIAGNOSIS — N39.3 STRESS INCONTINENCE: ICD-10-CM

## 2021-09-02 DIAGNOSIS — F33.0 MILD EPISODE OF RECURRENT MAJOR DEPRESSIVE DISORDER (HCC): ICD-10-CM

## 2021-09-02 RX ORDER — ARIPIPRAZOLE 5 MG/1
TABLET ORAL
Qty: 90 TABLET | Refills: 0 | Status: SHIPPED | OUTPATIENT
Start: 2021-09-02

## 2021-09-02 RX ORDER — ATORVASTATIN CALCIUM 10 MG/1
10 TABLET, FILM COATED ORAL DAILY
Qty: 90 TABLET | Refills: 0 | Status: SHIPPED | OUTPATIENT
Start: 2021-09-02

## 2021-09-02 RX ORDER — OXYBUTYNIN CHLORIDE 5 MG/1
5 TABLET, EXTENDED RELEASE ORAL EVERY EVENING
Qty: 90 TABLET | Refills: 0 | Status: SHIPPED | OUTPATIENT
Start: 2021-09-02

## 2021-09-02 NOTE — TELEPHONE ENCOUNTER
[place]s sent priority to her via mail they did not arrive. Asking if they could be resent to local pharmacy to be sent to her again.  -pended

## 2021-10-16 NOTE — TELEPHONE ENCOUNTER
Contraindication between metoclopramide prescribed today and ability she is currently taking. Should pharmacy still fill prescription? - - -

## 2021-11-15 ENCOUNTER — TELEPHONE (OUTPATIENT)
Dept: PRIMARY CARE CLINIC | Age: 56
End: 2021-11-15

## 2024-05-10 NOTE — PROGRESS NOTES
[facility] PM[facility]R at [facility] PHIL [facility]  [facility] [place], [address]  Phone: [phone]  Fax: [phone] Patient Evaluation for Rudi Butler  : 1965  MRN: [id number]  PCP: Noy Arceo DO  REF: Shabnam Graham DO  Date of visit: 1/10/20    Chief Complaint   Patient presents with    Lower Back Pain     right sided low back pain - radiates to her buttock       Dear Dr. Megan Chan,    Thank you for your referral of Rudi Butler to the Department of PM&R for evaluation of low back pain. As you know, this is a 47 y.o. female with pertinent past medical history of hypertension, hyperlipidemia, hypothyroidism, diabetes (a1c 9.7 on 19), stress incontinence, depression. HPI:   Patient presents as new patient evaluation regarding right-sided low back pain. She has recently moved to [place] from [place] and obviously transitioned all her care here. She is tried numerous treatment options in the past including gabapentin (did not work), ibuprofen (upset stomach), acetaminophen (stopped), Meloxicam (upset stomach), Toradol (unable to pay co-pay for IM), Norco (borrowed from a friend). She is here today because the pain is interfering with her daily activities. Pain began almost 1 year ago without inciting event. Location: right sided low back, radiation into right buttock   Quality: achy, sharp, pinching  Severity: 3/10 currently seated, can get to \"10-11/10\". Pain Alleviated by Norco, meloxicam, Toradol.    Aggravated by certain activities (she is unable to tell)   Timing: constant  Sensation: No numbness or tingling in BLE     PRIOR INJURIES/TREATMENT:  Ice/Heat: both - momentary relief  Brace: No  Medications:    Currently: diclofenac cream, acetaminophen   Past: gabapentin, various NSAIDs  Physical Therapy: No  Chiropractic: No  Injection: No  Prior Surgery in location of pain: No  Prior Fracture/Injury in location of pain: No     Falls: No Subjective     Cielo Peters is a 64 y.o. female who presents for the following: Rosacea (Patient presents today for rosacea. She states that Dr. Montes has given her rhofade, doxycycline and tretinoin 0.05% cream with minimal response. ) and Suspicious Skin Lesion (Patient states that she has a spot of concern  on her left abdomen that she has had for many years. She denies any treatment. She showed the spot to Dr. Montes and was informed not to do anything with it. She has a history of BCC, last one diagnosed 10 years ago).     Review of Systems:  No other skin or systemic complaints other than what is documented elsewhere in the note.    The following portions of the chart were reviewed this encounter and updated as appropriate:          Skin Cancer History  No skin cancer on file.      Specialty Problems          Dermatology Problems    History of basal cell carcinoma (BCC)    Rosacea    Candidal intertrigo    Skin lesion of face        Objective   Well appearing patient in no apparent distress; mood and affect are within normal limits.    A focused skin examination was performed face, abdomen. All findings within normal limits unless otherwise noted below.    Assessment/Plan   1. Sebaceous hyperplasia of face  Head - Anterior (Face)  Small yellow, lobulated papules with a central dell.    Benign nature of these skin lesions were reviewed with the patient. Lesions can be treated, however this is a cosmetic procedure and not covered by insurance.    Discussed cosmetic removal of lesions and cost    Patient requesting topical anesthesia prior to procedure - will send medication to [facility] pharmacy - provided name to patient. Patient will apply 1 hour prior to procedure. Advised she may still feel some of the procedure but procedure pain should be less    Expectations or wound healing, possible need for multiple treatments reviewed.    More of these lesions may develop over time.     2. Melanocytic nevi  of face  Left Forehead  Pink dome shaped symmetric papules - left lateral forehead dark brown dot    Clinically benign appearing nevi, no treatment is necessary.  ABCDEs of melanoma reviewed.  Please follow up should you notice any new or changing pre-existing skin lesion.      3. Rosacea  Head - Anterior (Face)  Mid face erythema with telangiectasias    The chronic and intermittently flaring nature of the skin condition was discussed with the patient today.     Patient well controlled on Rhofade, declined need for refills.    Related Medications  oxymetazoline (Rhofade) 1 % cream  APPLY TO THE AFFECTED AREA(S) DAILY    tretinoin (Retin-A) 0.05 % cream  Apply topically once daily at bedtime. apply to face    4. Seborrheic keratosis (2)  Left Abdomen (side) - Upper, Right Abdomen (side) - Upper  Brown, tan waxy macules and stuck on appearing papules and plaques    The benign nature of these skin lesions reviewed, reassure provided and no further treatment needed at this time.   These lesions can be removed, if symptomatic (itching, bleeding, rubbing on clothing, painful), otherwise removal is considered cosmetic.            anxiety, depression    Hematologic/Lymphatic/Immunologic: Denies bruising     Physical Exam:   Blood pressure 124/71, pulse 69, height 5' 3.5\" (1.613 m), weight 162 lb (73.5 kg), SpO2 97 %. PAIN: 3/10  GEN APPEARANCE: Pleasant, well developed, well nourished in no acute distress; Alert and Oriented; body habitus: Not obese  PSYCH: Normal mood and affect   HEAD: Normocephalic; no facial asymetry noted  EYES: Pupils equal and reactive; EOMI  RESP: Breathing non-labored  CARDIO: No pitting edema in bilateral lower extremities   SKIN: No lesions grossly visible on low back    MSK:    Lumbar/Hip Exam:      Inspection:  The Illiac crests were symmetrical.   Lumbar lordotic curvature was normal.  There was no evidence scoliotic curve. There was no ecchymosis or erythema    Palpation:  Tenderness over sacral spine area: Minimal, right side  Tenderness at the SI joint: Yes, concordant, right  Tenderness at the PSIS: No  Tenderness over the Gluteal area: Yes, right  Greater Trochanter Pain: No  Spinous process Pain: No.      There was no significant tenderness to the lumbar paraspinal region. R    L  Motor: Hip flexors  5/5    5/5   Quads   5/5    5/5   DF   5/5    5/5   EHL   5/5    5/5   Plantar Flexor  5/5    5/5    Sensory(LT):    Sensory was intact to bilateral L3-S1         R    L  Reflex Knee Jerk:  2    2  Medial Hamstring  2    2  Ankle Jerk:    2    2      Provacative testing:      R  L  SLR   neg  neg  Slump Test  neg  neg  Facet Grind  neg  neg  Arjun's Finger (x2) +  neg   Gaenslen  +  neg   Fabere's  + low back neg    ROM of Back:    Flexion: 110*  Extension:  30*    Gait: Reciprocal pattern with no assistive device, minimally antalgic, appropriate step length and toe clearance, appropriate speed, no Trendelenburg    Impression:   Aguilar Cherry is a 47 y.o. female who presents with right-sided low back pain secondary to sacroiliac joint dysfunction/pain.      1. Chronic right-sided low back pain without sciatica    2. Sacroiliac pain    3. Pain of right sacroiliac joint        Recommendations:  1. Discussion: I have discussed the natural history of the above diagnoses with her in detail, with more than 1/2 of the visit spent counseling her on the pathophysiology and treatment options. Discussed treatment options with patient today. Unfortunately cannot perform steroid injection until hemoglobin A1c is at 7.0 or below. 2. Activity Modification: Patient to continue being as physically active as possible while avoiding complete inactivity. No current restrictions placed. 3. Medications: Acetaminophen 1000 mg 3 times daily PRN for pain. Instructed to take no more than 3000 mg daily or with alcohol. Also, prescription sent to the compounding pharmacy for a compounded anti-inflammatory/pain cream to apply to back. 4. Referrals: Physical therapy for comprehensive low back pain program with [facility] of [facility]. No surgical referral needed at this point. 5. Images: No red flags on examination today, such as severe or progressive neurological deficits or suspicion of serious underlying condition. With that said, additional imaging not indicated at this time. We will continue to monitor response to conservative treatment. 6. Labs: None today. 7. DME: None today. 8. Injection: None today. Can consider ultrasound-guided sacroiliac joint steroid injection once diabetes better controlled with hemoglobin A1c at 7.0 or below. 9. Follow-up: In about 2 months. At that time we will review progress with above interventions. The patient was educated about the diagnosis, prognosis, indications, risks and benefits of treatment. An opportunity to ask questions was given to the patient and questions were answered. The patient agreed to proceed with the recommended treatment as described above. Thank you for the consultation and for allowing me to participate in the care of this patient.       Sincerely,